# Patient Record
Sex: MALE | Race: WHITE | URBAN - NONMETROPOLITAN AREA
[De-identification: names, ages, dates, MRNs, and addresses within clinical notes are randomized per-mention and may not be internally consistent; named-entity substitution may affect disease eponyms.]

---

## 2017-01-12 ENCOUNTER — OFFICE VISIT (OUTPATIENT)
Dept: FAMILY MEDICINE CLINIC | Age: 32
End: 2017-01-12

## 2017-01-12 VITALS
HEIGHT: 71 IN | OXYGEN SATURATION: 98 % | BODY MASS INDEX: 31.02 KG/M2 | WEIGHT: 221.6 LBS | HEART RATE: 86 BPM | DIASTOLIC BLOOD PRESSURE: 68 MMHG | SYSTOLIC BLOOD PRESSURE: 128 MMHG

## 2017-01-12 DIAGNOSIS — M25.561 ACUTE PAIN OF RIGHT KNEE: Primary | ICD-10-CM

## 2017-01-12 PROCEDURE — 99213 OFFICE O/P EST LOW 20 MIN: CPT | Performed by: FAMILY MEDICINE

## 2017-01-12 RX ORDER — NAPROXEN 500 MG/1
500 TABLET ORAL 2 TIMES DAILY WITH MEALS
COMMUNITY
End: 2017-02-06

## 2017-01-12 RX ORDER — MELOXICAM 15 MG/1
15 TABLET ORAL DAILY
Qty: 30 TABLET | Refills: 0 | Status: SHIPPED | OUTPATIENT
Start: 2017-01-12 | End: 2017-02-06

## 2017-01-12 ASSESSMENT — ENCOUNTER SYMPTOMS: SHORTNESS OF BREATH: 0

## 2017-01-17 ENCOUNTER — TELEPHONE (OUTPATIENT)
Dept: FAMILY MEDICINE CLINIC | Age: 32
End: 2017-01-17

## 2017-01-19 ENCOUNTER — OFFICE VISIT (OUTPATIENT)
Dept: ORTHOPEDIC SURGERY | Age: 32
End: 2017-01-19

## 2017-01-19 VITALS
BODY MASS INDEX: 31.02 KG/M2 | HEART RATE: 82 BPM | SYSTOLIC BLOOD PRESSURE: 118 MMHG | HEIGHT: 71 IN | DIASTOLIC BLOOD PRESSURE: 72 MMHG | WEIGHT: 221.56 LBS

## 2017-01-19 DIAGNOSIS — S83.511A RUPTURE OF ANTERIOR CRUCIATE LIGAMENT OF RIGHT KNEE, INITIAL ENCOUNTER: Primary | ICD-10-CM

## 2017-01-19 DIAGNOSIS — S83.211A BUCKET-HANDLE TEAR OF MEDIAL MENISCUS OF RIGHT KNEE AS CURRENT INJURY, INITIAL ENCOUNTER: ICD-10-CM

## 2017-01-19 DIAGNOSIS — S83.411A SPRAIN OF MEDIAL COLLATERAL LIGAMENT OF RIGHT KNEE, INITIAL ENCOUNTER: ICD-10-CM

## 2017-01-19 PROCEDURE — 99243 OFF/OP CNSLTJ NEW/EST LOW 30: CPT | Performed by: ORTHOPAEDIC SURGERY

## 2017-01-21 ENCOUNTER — HOSPITAL ENCOUNTER (OUTPATIENT)
Dept: MRI IMAGING | Age: 32
Discharge: HOME | End: 2017-01-21
Attending: ORTHOPAEDIC SURGERY | Admitting: ORTHOPAEDIC SURGERY

## 2017-01-21 DIAGNOSIS — M25.561 PAIN IN RIGHT KNEE: ICD-10-CM

## 2017-01-21 DIAGNOSIS — S83.511A RUPTURE OF ANTERIOR CRUCIATE LIGAMENT OF RIGHT KNEE, INITIAL ENCOUNTER: ICD-10-CM

## 2017-01-24 ENCOUNTER — OFFICE VISIT (OUTPATIENT)
Dept: ORTHOPEDIC SURGERY | Age: 32
End: 2017-01-24

## 2017-01-24 VITALS
HEART RATE: 96 BPM | WEIGHT: 221.56 LBS | SYSTOLIC BLOOD PRESSURE: 101 MMHG | HEIGHT: 71 IN | DIASTOLIC BLOOD PRESSURE: 69 MMHG | BODY MASS INDEX: 31.02 KG/M2

## 2017-01-24 DIAGNOSIS — S83.511A RUPTURE OF ANTERIOR CRUCIATE LIGAMENT OF RIGHT KNEE, INITIAL ENCOUNTER: Primary | ICD-10-CM

## 2017-01-24 DIAGNOSIS — S83.241A OTHER TEAR OF MEDIAL MENISCUS OF RIGHT KNEE AS CURRENT INJURY, INITIAL ENCOUNTER: ICD-10-CM

## 2017-01-24 PROCEDURE — L1832 KO ADJ JNT POS R SUP PRE CST: HCPCS | Performed by: ORTHOPAEDIC SURGERY

## 2017-01-24 PROCEDURE — 99214 OFFICE O/P EST MOD 30 MIN: CPT | Performed by: ORTHOPAEDIC SURGERY

## 2017-01-25 ENCOUNTER — HOSPITAL ENCOUNTER (OUTPATIENT)
Dept: PHYSICAL THERAPY | Age: 32
Discharge: HOME | End: 2017-01-31
Admitting: ORTHOPAEDIC SURGERY

## 2017-01-31 ENCOUNTER — OFFICE VISIT (OUTPATIENT)
Dept: ORTHOPEDIC SURGERY | Age: 32
End: 2017-01-31

## 2017-01-31 VITALS
DIASTOLIC BLOOD PRESSURE: 76 MMHG | HEART RATE: 79 BPM | SYSTOLIC BLOOD PRESSURE: 119 MMHG | HEIGHT: 71 IN | BODY MASS INDEX: 31.02 KG/M2 | WEIGHT: 221.56 LBS

## 2017-01-31 DIAGNOSIS — S83.511A RUPTURE OF ANTERIOR CRUCIATE LIGAMENT OF RIGHT KNEE, INITIAL ENCOUNTER: Primary | ICD-10-CM

## 2017-01-31 DIAGNOSIS — S83.241A TEAR OF MEDIAL MENISCUS OF RIGHT KNEE, CURRENT, UNSPECIFIED TEAR TYPE, INITIAL ENCOUNTER: ICD-10-CM

## 2017-01-31 PROCEDURE — 99213 OFFICE O/P EST LOW 20 MIN: CPT | Performed by: ORTHOPAEDIC SURGERY

## 2017-02-01 ENCOUNTER — TELEPHONE (OUTPATIENT)
Dept: ORTHOPEDIC SURGERY | Age: 32
End: 2017-02-01

## 2017-02-02 ENCOUNTER — PAT TELEPHONE (OUTPATIENT)
Dept: PREADMISSION TESTING | Age: 32
End: 2017-02-02

## 2017-02-06 ENCOUNTER — OFFICE VISIT (OUTPATIENT)
Dept: FAMILY MEDICINE CLINIC | Age: 32
End: 2017-02-06

## 2017-02-06 VITALS
HEART RATE: 80 BPM | SYSTOLIC BLOOD PRESSURE: 126 MMHG | WEIGHT: 222.2 LBS | BODY MASS INDEX: 31.11 KG/M2 | HEIGHT: 71 IN | OXYGEN SATURATION: 98 % | TEMPERATURE: 97.8 F | DIASTOLIC BLOOD PRESSURE: 78 MMHG

## 2017-02-06 DIAGNOSIS — Z01.818 PREOP EXAMINATION: Primary | ICD-10-CM

## 2017-02-06 DIAGNOSIS — S83.211D BUCKET-HANDLE TEAR OF MEDIAL MENISCUS OF RIGHT KNEE AS CURRENT INJURY, SUBSEQUENT ENCOUNTER: ICD-10-CM

## 2017-02-06 DIAGNOSIS — S83.511D RUPTURE OF ANTERIOR CRUCIATE LIGAMENT OF RIGHT KNEE, SUBSEQUENT ENCOUNTER: ICD-10-CM

## 2017-02-06 PROCEDURE — 99213 OFFICE O/P EST LOW 20 MIN: CPT | Performed by: FAMILY MEDICINE

## 2017-02-06 ASSESSMENT — ENCOUNTER SYMPTOMS: SHORTNESS OF BREATH: 0

## 2017-02-07 ENCOUNTER — TELEPHONE (OUTPATIENT)
Dept: ORTHOPEDIC SURGERY | Age: 32
End: 2017-02-07

## 2017-02-08 ENCOUNTER — HOSPITAL ENCOUNTER (OUTPATIENT)
Dept: OTHER | Age: 32
Discharge: HOME | End: 2017-02-08
Attending: ORTHOPAEDIC SURGERY | Admitting: ORTHOPAEDIC SURGERY

## 2017-02-08 ENCOUNTER — HOSPITAL ENCOUNTER (OUTPATIENT)
Dept: SURGERY | Age: 32
Discharge: HOME | End: 2017-02-08
Admitting: ORTHOPAEDIC SURGERY

## 2017-02-08 VITALS
OXYGEN SATURATION: 100 % | SYSTOLIC BLOOD PRESSURE: 114 MMHG | HEART RATE: 76 BPM | HEIGHT: 71 IN | WEIGHT: 220 LBS | TEMPERATURE: 97.2 F | RESPIRATION RATE: 16 BRPM | DIASTOLIC BLOOD PRESSURE: 68 MMHG | BODY MASS INDEX: 30.8 KG/M2

## 2017-02-08 DIAGNOSIS — Z98.890 STATUS POST MEDIAL MENISCUS REPAIR: Primary | ICD-10-CM

## 2017-02-08 RX ORDER — MEPERIDINE HYDROCHLORIDE 25 MG/ML
12.5 INJECTION INTRAMUSCULAR; INTRAVENOUS; SUBCUTANEOUS EVERY 5 MIN PRN
Status: DISCONTINUED | OUTPATIENT
Start: 2017-02-08 | End: 2017-02-09 | Stop reason: HOSPADM

## 2017-02-08 RX ORDER — OXYCODONE HYDROCHLORIDE 5 MG/1
5 TABLET ORAL PRN
Status: ACTIVE | OUTPATIENT
Start: 2017-02-08 | End: 2017-02-08

## 2017-02-08 RX ORDER — BUPIVACAINE HYDROCHLORIDE 2.5 MG/ML
INJECTION, SOLUTION EPIDURAL; INFILTRATION; INTRACAUDAL
Status: DISPENSED
Start: 2017-02-08 | End: 2017-02-08

## 2017-02-08 RX ORDER — PROMETHAZINE HYDROCHLORIDE 25 MG/ML
6.25 INJECTION, SOLUTION INTRAMUSCULAR; INTRAVENOUS
Status: ACTIVE | OUTPATIENT
Start: 2017-02-08 | End: 2017-02-08

## 2017-02-08 RX ORDER — MIDAZOLAM HYDROCHLORIDE 1 MG/ML
INJECTION INTRAMUSCULAR; INTRAVENOUS
Status: DISPENSED
Start: 2017-02-08 | End: 2017-02-08

## 2017-02-08 RX ORDER — MORPHINE SULFATE 10 MG/ML
1 INJECTION, SOLUTION INTRAMUSCULAR; INTRAVENOUS EVERY 5 MIN PRN
Status: DISCONTINUED | OUTPATIENT
Start: 2017-02-08 | End: 2017-02-09 | Stop reason: HOSPADM

## 2017-02-08 RX ORDER — LIDOCAINE HYDROCHLORIDE 10 MG/ML
1 INJECTION, SOLUTION EPIDURAL; INFILTRATION; INTRACAUDAL; PERINEURAL
Status: COMPLETED | OUTPATIENT
Start: 2017-02-08 | End: 2017-02-08

## 2017-02-08 RX ORDER — HYDRALAZINE HYDROCHLORIDE 20 MG/ML
5 INJECTION INTRAMUSCULAR; INTRAVENOUS EVERY 10 MIN PRN
Status: DISCONTINUED | OUTPATIENT
Start: 2017-02-08 | End: 2017-02-09 | Stop reason: HOSPADM

## 2017-02-08 RX ORDER — OXYCODONE HYDROCHLORIDE AND ACETAMINOPHEN 5; 325 MG/1; MG/1
1 TABLET ORAL EVERY 6 HOURS PRN
Qty: 40 TABLET | Refills: 0 | Status: SHIPPED | OUTPATIENT
Start: 2017-02-08 | End: 2017-02-15

## 2017-02-08 RX ORDER — HYDROMORPHONE HCL 110MG/55ML
0.5 PATIENT CONTROLLED ANALGESIA SYRINGE INTRAVENOUS EVERY 5 MIN PRN
Status: DISCONTINUED | OUTPATIENT
Start: 2017-02-08 | End: 2017-02-09 | Stop reason: HOSPADM

## 2017-02-08 RX ORDER — METOCLOPRAMIDE HYDROCHLORIDE 5 MG/ML
10 INJECTION INTRAMUSCULAR; INTRAVENOUS
Status: ACTIVE | OUTPATIENT
Start: 2017-02-08 | End: 2017-02-08

## 2017-02-08 RX ORDER — LABETALOL HYDROCHLORIDE 5 MG/ML
5 INJECTION, SOLUTION INTRAVENOUS EVERY 10 MIN PRN
Status: DISCONTINUED | OUTPATIENT
Start: 2017-02-08 | End: 2017-02-09 | Stop reason: HOSPADM

## 2017-02-08 RX ORDER — HYDROMORPHONE HCL 110MG/55ML
0.25 PATIENT CONTROLLED ANALGESIA SYRINGE INTRAVENOUS EVERY 5 MIN PRN
Status: DISCONTINUED | OUTPATIENT
Start: 2017-02-08 | End: 2017-02-09 | Stop reason: HOSPADM

## 2017-02-08 RX ORDER — DEXAMETHASONE SODIUM PHOSPHATE 10 MG/ML
INJECTION, SOLUTION INTRAMUSCULAR; INTRAVENOUS
Status: DISPENSED
Start: 2017-02-08 | End: 2017-02-08

## 2017-02-08 RX ORDER — DIPHENHYDRAMINE HYDROCHLORIDE 50 MG/ML
12.5 INJECTION INTRAMUSCULAR; INTRAVENOUS
Status: ACTIVE | OUTPATIENT
Start: 2017-02-08 | End: 2017-02-08

## 2017-02-08 RX ORDER — BUPIVACAINE HYDROCHLORIDE 5 MG/ML
INJECTION, SOLUTION EPIDURAL; INTRACAUDAL
Status: DISPENSED
Start: 2017-02-08 | End: 2017-02-08

## 2017-02-08 RX ORDER — OXYCODONE HYDROCHLORIDE 5 MG/1
10 TABLET ORAL PRN
Status: ACTIVE | OUTPATIENT
Start: 2017-02-08 | End: 2017-02-08

## 2017-02-08 RX ORDER — ACETAMINOPHEN 10 MG/ML
1000 INJECTION, SOLUTION INTRAVENOUS ONCE
Status: COMPLETED | OUTPATIENT
Start: 2017-02-08 | End: 2017-02-08

## 2017-02-08 RX ORDER — CEFAZOLIN SODIUM 1 G/3ML
INJECTION, POWDER, FOR SOLUTION INTRAMUSCULAR; INTRAVENOUS
Status: DISPENSED
Start: 2017-02-08 | End: 2017-02-08

## 2017-02-08 RX ORDER — ONDANSETRON 2 MG/ML
4 INJECTION INTRAMUSCULAR; INTRAVENOUS ONCE
Status: DISCONTINUED | OUTPATIENT
Start: 2017-02-08 | End: 2017-02-09 | Stop reason: HOSPADM

## 2017-02-08 RX ORDER — SODIUM CHLORIDE 0.9 % (FLUSH) 0.9 %
10 SYRINGE (ML) INJECTION EVERY 12 HOURS SCHEDULED
Status: DISCONTINUED | OUTPATIENT
Start: 2017-02-08 | End: 2017-02-09 | Stop reason: HOSPADM

## 2017-02-08 RX ORDER — ONDANSETRON 2 MG/ML
INJECTION INTRAMUSCULAR; INTRAVENOUS
Status: COMPLETED
Start: 2017-02-08 | End: 2017-02-08

## 2017-02-08 RX ORDER — SODIUM CHLORIDE, SODIUM LACTATE, POTASSIUM CHLORIDE, CALCIUM CHLORIDE 600; 310; 30; 20 MG/100ML; MG/100ML; MG/100ML; MG/100ML
INJECTION, SOLUTION INTRAVENOUS CONTINUOUS
Status: DISCONTINUED | OUTPATIENT
Start: 2017-02-08 | End: 2017-02-09 | Stop reason: HOSPADM

## 2017-02-08 RX ORDER — SODIUM CHLORIDE 0.9 % (FLUSH) 0.9 %
10 SYRINGE (ML) INJECTION PRN
Status: DISCONTINUED | OUTPATIENT
Start: 2017-02-08 | End: 2017-02-09 | Stop reason: HOSPADM

## 2017-02-08 RX ADMIN — LIDOCAINE HYDROCHLORIDE 0.1 ML: 10 INJECTION, SOLUTION EPIDURAL; INFILTRATION; INTRACAUDAL; PERINEURAL at 06:54

## 2017-02-08 RX ADMIN — SODIUM CHLORIDE, SODIUM LACTATE, POTASSIUM CHLORIDE, CALCIUM CHLORIDE: 600; 310; 30; 20 INJECTION, SOLUTION INTRAVENOUS at 06:54

## 2017-02-08 RX ADMIN — ACETAMINOPHEN 1000 MG: 10 INJECTION, SOLUTION INTRAVENOUS at 06:55

## 2017-02-08 RX ADMIN — ONDANSETRON 4 MG: 2 INJECTION INTRAMUSCULAR; INTRAVENOUS at 10:32

## 2017-02-08 ASSESSMENT — PAIN SCALES - GENERAL
PAINLEVEL_OUTOF10: 0

## 2017-02-08 ASSESSMENT — PAIN - FUNCTIONAL ASSESSMENT: PAIN_FUNCTIONAL_ASSESSMENT: 0-10

## 2017-02-21 ENCOUNTER — OFFICE VISIT (OUTPATIENT)
Dept: ORTHOPEDIC SURGERY | Age: 32
End: 2017-02-21

## 2017-02-21 VITALS
WEIGHT: 220.02 LBS | HEIGHT: 71 IN | HEART RATE: 82 BPM | DIASTOLIC BLOOD PRESSURE: 83 MMHG | BODY MASS INDEX: 30.8 KG/M2 | SYSTOLIC BLOOD PRESSURE: 134 MMHG

## 2017-02-21 DIAGNOSIS — M25.561 RIGHT KNEE PAIN, UNSPECIFIED CHRONICITY: Primary | ICD-10-CM

## 2017-02-21 DIAGNOSIS — Z98.890 STATUS POST MEDIAL MENISCUS REPAIR: ICD-10-CM

## 2017-02-21 DIAGNOSIS — Z98.890 S/P ACL RECONSTRUCTION: ICD-10-CM

## 2017-02-21 PROCEDURE — 73560 X-RAY EXAM OF KNEE 1 OR 2: CPT | Performed by: ORTHOPAEDIC SURGERY

## 2017-02-21 PROCEDURE — 99024 POSTOP FOLLOW-UP VISIT: CPT | Performed by: ORTHOPAEDIC SURGERY

## 2017-03-21 ENCOUNTER — OFFICE VISIT (OUTPATIENT)
Dept: ORTHOPEDIC SURGERY | Age: 32
End: 2017-03-21

## 2017-03-21 VITALS
HEART RATE: 82 BPM | DIASTOLIC BLOOD PRESSURE: 74 MMHG | HEIGHT: 71 IN | BODY MASS INDEX: 30.8 KG/M2 | SYSTOLIC BLOOD PRESSURE: 116 MMHG | WEIGHT: 220.02 LBS

## 2017-03-21 DIAGNOSIS — S83.511D RUPTURE OF ANTERIOR CRUCIATE LIGAMENT OF RIGHT KNEE, SUBSEQUENT ENCOUNTER: Primary | ICD-10-CM

## 2017-03-21 DIAGNOSIS — S83.411D SPRAIN OF MEDIAL COLLATERAL LIGAMENT OF RIGHT KNEE, SUBSEQUENT ENCOUNTER: ICD-10-CM

## 2017-03-21 DIAGNOSIS — S83.211D BUCKET-HANDLE TEAR OF MEDIAL MENISCUS OF RIGHT KNEE AS CURRENT INJURY, SUBSEQUENT ENCOUNTER: ICD-10-CM

## 2017-03-21 PROCEDURE — 99024 POSTOP FOLLOW-UP VISIT: CPT | Performed by: ORTHOPAEDIC SURGERY

## 2017-03-27 ENCOUNTER — HOSPITAL ENCOUNTER (OUTPATIENT)
Dept: PHYSICAL THERAPY | Age: 32
Discharge: HOME | End: 2017-03-27
Admitting: ORTHOPAEDIC SURGERY

## 2017-03-29 ENCOUNTER — HOSPITAL ENCOUNTER (OUTPATIENT)
Dept: PHYSICAL THERAPY | Age: 32
Discharge: HOME | End: 2017-03-29
Admitting: ORTHOPAEDIC SURGERY

## 2017-04-03 ENCOUNTER — HOSPITAL ENCOUNTER (OUTPATIENT)
Dept: PHYSICAL THERAPY | Age: 32
Discharge: HOME | End: 2017-04-03
Admitting: ORTHOPAEDIC SURGERY

## 2017-04-14 ENCOUNTER — TELEPHONE (OUTPATIENT)
Dept: ORTHOPEDIC SURGERY | Age: 32
End: 2017-04-14

## 2017-04-17 ENCOUNTER — HOSPITAL ENCOUNTER (OUTPATIENT)
Dept: PHYSICAL THERAPY | Age: 32
Discharge: HOME | End: 2017-04-17
Admitting: ORTHOPAEDIC SURGERY

## 2017-04-20 ENCOUNTER — TELEPHONE (OUTPATIENT)
Dept: ORTHOPEDIC SURGERY | Age: 32
End: 2017-04-20

## 2017-04-25 ENCOUNTER — OFFICE VISIT (OUTPATIENT)
Dept: ORTHOPEDIC SURGERY | Age: 32
End: 2017-04-25

## 2017-04-25 VITALS — HEIGHT: 71 IN | BODY MASS INDEX: 30.8 KG/M2 | WEIGHT: 220.02 LBS

## 2017-04-25 DIAGNOSIS — Z98.890 S/P ACL REPAIR: Primary | ICD-10-CM

## 2017-04-25 DIAGNOSIS — Z98.890 STATUS POST MEDIAL MENISCUS REPAIR: ICD-10-CM

## 2017-04-25 DIAGNOSIS — S83.411D SPRAIN OF MEDIAL COLLATERAL LIGAMENT OF RIGHT KNEE, SUBSEQUENT ENCOUNTER: ICD-10-CM

## 2017-04-25 PROCEDURE — 73560 X-RAY EXAM OF KNEE 1 OR 2: CPT | Performed by: ORTHOPAEDIC SURGERY

## 2017-04-25 PROCEDURE — 99024 POSTOP FOLLOW-UP VISIT: CPT | Performed by: ORTHOPAEDIC SURGERY

## 2017-05-01 ENCOUNTER — HOSPITAL ENCOUNTER (OUTPATIENT)
Dept: PHYSICAL THERAPY | Age: 32
Discharge: HOME | End: 2017-05-01
Admitting: ORTHOPAEDIC SURGERY

## 2017-05-03 ENCOUNTER — TELEPHONE (OUTPATIENT)
Dept: ORTHOPEDIC SURGERY | Age: 32
End: 2017-05-03

## 2017-05-10 ENCOUNTER — OFFICE VISIT (OUTPATIENT)
Dept: FAMILY MEDICINE CLINIC | Age: 32
End: 2017-05-10

## 2017-05-10 VITALS
DIASTOLIC BLOOD PRESSURE: 80 MMHG | WEIGHT: 221.8 LBS | SYSTOLIC BLOOD PRESSURE: 120 MMHG | OXYGEN SATURATION: 98 % | HEIGHT: 71 IN | HEART RATE: 104 BPM | BODY MASS INDEX: 31.05 KG/M2

## 2017-05-10 DIAGNOSIS — R07.1 CHEST PAIN ON BREATHING: Primary | ICD-10-CM

## 2017-05-10 DIAGNOSIS — R07.89 OTHER CHEST PAIN: ICD-10-CM

## 2017-05-10 DIAGNOSIS — R42 LIGHTHEADEDNESS: ICD-10-CM

## 2017-05-10 PROCEDURE — 93000 ELECTROCARDIOGRAM COMPLETE: CPT | Performed by: FAMILY MEDICINE

## 2017-05-10 PROCEDURE — 99213 OFFICE O/P EST LOW 20 MIN: CPT | Performed by: FAMILY MEDICINE

## 2017-05-10 RX ORDER — NORTRIPTYLINE HYDROCHLORIDE 25 MG/1
25 CAPSULE ORAL NIGHTLY
COMMUNITY
End: 2017-10-24 | Stop reason: ALTCHOICE

## 2017-05-10 ASSESSMENT — ENCOUNTER SYMPTOMS
SHORTNESS OF BREATH: 0
VOMITING: 0
NAUSEA: 0

## 2017-08-22 ENCOUNTER — OFFICE VISIT (OUTPATIENT)
Dept: ORTHOPEDIC SURGERY | Age: 32
End: 2017-08-22

## 2017-08-22 VITALS — HEIGHT: 71 IN | BODY MASS INDEX: 31.05 KG/M2 | WEIGHT: 221.78 LBS

## 2017-08-22 DIAGNOSIS — Z98.890 S/P ACL REPAIR: ICD-10-CM

## 2017-08-22 DIAGNOSIS — Z98.890 STATUS POST MEDIAL MENISCUS REPAIR: Primary | ICD-10-CM

## 2017-08-22 PROCEDURE — 99213 OFFICE O/P EST LOW 20 MIN: CPT | Performed by: ORTHOPAEDIC SURGERY

## 2017-10-24 ENCOUNTER — OFFICE VISIT (OUTPATIENT)
Dept: FAMILY MEDICINE CLINIC | Age: 32
End: 2017-10-24

## 2017-10-24 VITALS
SYSTOLIC BLOOD PRESSURE: 128 MMHG | HEIGHT: 71 IN | BODY MASS INDEX: 31.61 KG/M2 | WEIGHT: 225.8 LBS | HEART RATE: 92 BPM | OXYGEN SATURATION: 98 % | DIASTOLIC BLOOD PRESSURE: 80 MMHG

## 2017-10-24 DIAGNOSIS — L60.0 INGROWN NAIL OF GREAT TOE OF RIGHT FOOT: Primary | ICD-10-CM

## 2017-10-24 PROCEDURE — 99213 OFFICE O/P EST LOW 20 MIN: CPT | Performed by: FAMILY MEDICINE

## 2017-10-24 PROCEDURE — 1036F TOBACCO NON-USER: CPT | Performed by: FAMILY MEDICINE

## 2017-10-24 PROCEDURE — G8484 FLU IMMUNIZE NO ADMIN: HCPCS | Performed by: FAMILY MEDICINE

## 2017-10-24 PROCEDURE — G8417 CALC BMI ABV UP PARAM F/U: HCPCS | Performed by: FAMILY MEDICINE

## 2017-10-24 PROCEDURE — G8427 DOCREV CUR MEDS BY ELIG CLIN: HCPCS | Performed by: FAMILY MEDICINE

## 2017-10-24 RX ORDER — CEPHALEXIN 500 MG/1
500 CAPSULE ORAL 3 TIMES DAILY
Qty: 30 CAPSULE | Refills: 0 | Status: SHIPPED | OUTPATIENT
Start: 2017-10-24 | End: 2018-05-25 | Stop reason: ALTCHOICE

## 2017-10-24 RX ORDER — DESIPRAMINE HYDROCHLORIDE 25 MG/1
12.5 TABLET ORAL DAILY
COMMUNITY

## 2017-10-24 ASSESSMENT — ENCOUNTER SYMPTOMS: COLOR CHANGE: 1

## 2017-10-24 NOTE — PROGRESS NOTES
Subjective:      Patient ID: Christa Lopez is a 28 y.o. male. HPI  Ingrown nail over past several weeks. Soreness noted. Has had drainage and swelling. Not too bad right now. More swollen when on it more. No fevers noted. Review of Systems   Constitutional: Negative for fever. Skin: Positive for color change. Ingrown nail and pain       Objective:   Physical Exam   Constitutional: He is oriented to person, place, and time. He appears well-developed and well-nourished. HENT:   Head: Normocephalic and atraumatic. Eyes: Conjunctivae and EOM are normal.   Pulmonary/Chest: Effort normal.   Musculoskeletal:        Feet:    Neurological: He is alert and oriented to person, place, and time. Skin: Skin is warm and dry. Psychiatric: He has a normal mood and affect. /80   Pulse 92   Ht 5' 11\" (1.803 m)   Wt 225 lb 12.8 oz (102.4 kg)   SpO2 98%   BMI 31.49 kg/m²   Assessment:      1. Ingrown nail of great toe of right foot  Try add abx to help infxn. Epsom salts soaks. If not better, consider podiatry for possible partial nail removal or trimming to alleviate symptoms  - cephALEXin (KEFLEX) 500 MG capsule; Take 1 capsule by mouth 3 times daily  Dispense: 30 capsule;  Refill: 0         Plan:      Mentioned at end of visit that headaches improved w/ meds ordered by neuro

## 2017-11-03 ENCOUNTER — TELEPHONE (OUTPATIENT)
Dept: FAMILY MEDICINE CLINIC | Age: 32
End: 2017-11-03

## 2017-11-03 DIAGNOSIS — M79.671 RIGHT FOOT PAIN: Primary | ICD-10-CM

## 2018-01-04 ENCOUNTER — TELEPHONE (OUTPATIENT)
Dept: FAMILY MEDICINE CLINIC | Age: 33
End: 2018-01-04

## 2018-01-10 ENCOUNTER — HOSPITAL ENCOUNTER (OUTPATIENT)
Dept: PHYSICAL THERAPY | Age: 33
Discharge: HOME | End: 2018-01-31
Admitting: ORTHOPAEDIC SURGERY

## 2018-01-10 NOTE — FLOWSHEET NOTE
[facility]        Lower Extremity Daily Performance Training Note  Date:  1/10/2018    Patient Name:  Sahra Fuentes    :  1985  MRN: [id number]  Restrictions/Precautions:   Medical/Treatment Diagnosis Information:   ·  R knee ACL/meniscus repair  ·    Insurance/Certification information:       Physician Information:   Martinez      Pain level: 1-2/10 With jumping    Visit Number: 1    Subjective: Pt notes he has been doing some sporting activities. Notes he goes to the gym back home. He continues to have difficulty doing any \"skipping or jumping\". Objective:  Observation: Decent form with jumping. Poor conditioning. Exercises:  Exercise/Equipment Resistance/Repetitions Other comments   Eliptical  7' lkvl 4    Calf stretch/ calf raise 30\" x 3/ x 30    Hamstring stretch  30\" x 2 ea    Ladder drill  Bunny hop/2 toot in 2 foot out/2 foot in 1 foot out/Hop scotch X 3 ea   Clam shells vs wall/glut med vs wall X30/0# x 30 ea    Wall sits  1' x 5    Monster walks  X 5    LSD 6\"  X 30                                                                  Other Therapeutic Activities:     Home Exercise Program:  At home and at local rec center. Patient Education:    verbal      Assessment:  [x] Patient tolerated treatment well [x] Patient limited by fatigue  [] Patient limited by pain  [] Patient limited by other medical complications  [] Other:     Prognosis: [x] Good [] Fair  [] Poor    Patient Requires Follow-up: [x] Yes  [] No    Plan:   [x] Continue per plan of care [] Alter current plan (see comments)  [] Plan of care initiated [] Hold pending MD visit [] Discharge  Plan for Next Session: Continue rehab at home and at gym. Follow up in [place] to advance program.      MD Follow-up: JUSTIN    Electronically signed by:  Román Villanueva ATC     Tx was performed by STEVO as a part of the [facility] program following PT's recommendations/guidance. Cosigned by:

## 2018-02-01 ENCOUNTER — HOSPITAL ENCOUNTER (OUTPATIENT)
Dept: PHYSICAL THERAPY | Age: 33
Discharge: HOME | End: 2018-02-28
Attending: ORTHOPAEDIC SURGERY | Admitting: ORTHOPAEDIC SURGERY

## 2018-02-06 ENCOUNTER — OFFICE VISIT (OUTPATIENT)
Dept: ORTHOPEDIC SURGERY | Age: 33
End: 2018-02-06

## 2018-02-06 VITALS
WEIGHT: 215 LBS | BODY MASS INDEX: 30.1 KG/M2 | HEIGHT: 71 IN | SYSTOLIC BLOOD PRESSURE: 123 MMHG | DIASTOLIC BLOOD PRESSURE: 79 MMHG | HEART RATE: 84 BPM

## 2018-02-06 DIAGNOSIS — M25.561 RIGHT KNEE PAIN, UNSPECIFIED CHRONICITY: Primary | ICD-10-CM

## 2018-02-06 DIAGNOSIS — Z98.890 S/P ACL REPAIR: ICD-10-CM

## 2018-02-06 DIAGNOSIS — Z98.890 STATUS POST MEDIAL MENISCUS REPAIR: ICD-10-CM

## 2018-02-06 PROCEDURE — G8484 FLU IMMUNIZE NO ADMIN: HCPCS | Performed by: ORTHOPAEDIC SURGERY

## 2018-02-06 PROCEDURE — 1036F TOBACCO NON-USER: CPT | Performed by: ORTHOPAEDIC SURGERY

## 2018-02-06 PROCEDURE — G8417 CALC BMI ABV UP PARAM F/U: HCPCS | Performed by: ORTHOPAEDIC SURGERY

## 2018-02-06 PROCEDURE — G8427 DOCREV CUR MEDS BY ELIG CLIN: HCPCS | Performed by: ORTHOPAEDIC SURGERY

## 2018-02-06 PROCEDURE — 99213 OFFICE O/P EST LOW 20 MIN: CPT | Performed by: ORTHOPAEDIC SURGERY

## 2018-02-06 NOTE — PROGRESS NOTES
Chief Complaint:  Follow-up (one yr check up ACL repair)      SUBJECTIVE:  Juna Aase is a 28 y.o. male who returns today 1 year s/p right knee arthroscopy with ACL reconstruction with allograft and medial meniscus repair. Overall he is 70% improved from before surgery, is still not fully completed physical therapy, is working through the [employer] program at this time. Has 0 out of 10 pain, continues to notice weakness, denies episodes of instability. Has returned to work as a  without difficulty. He has returned to basketball on occasion and wears his brace. DOS: February 8, 2017    Pain Assessment:  Pain Assessment  Location of Pain: Knee  Location Modifiers: Right  Severity of Pain: 0      OBJECTIVE:  Vital Signs:  Vitals:    02/06/18 1033   BP: 123/79   Pulse: 84   Weight: 215 lb (97.5 kg)   Height: 5' 11\" (1.803 m)       Appearance: alert, well appearing, and in no distress, oriented to person, place, and time and normal appearing weight. Physical exam:   Distal neurovascular exam is intact, incisions are well-healed, range of motion 0-130°. No tenderness on the medial or lateral joint lines. Lachman exam is stable at 30° with firm endpoint noted. Negative Ludwin's. Atrophy is noted throughout quadriceps and hamstring. Able to do a straight leg raise. Diagnostic Test Findings:   Xrays obtained and reviewed in office. 2 views of the right knee xrays show normal post-op changes following an ACL reconstruction. Assessment :  One year status post ACL reconstruction and medial meniscus repair    Impression:  Encounter Diagnoses   Name Primary?  Right knee pain, unspecified chronicity Yes    S/P ACL repair     Status post medial meniscus repair        Office Procedures:  Orders Placed This Encounter   Procedures    XR KNEE RIGHT (1-2 VIEWS)     No orders of the defined types were placed in this encounter.         Treatment Plan:  Continue to progress with physical therapy

## 2018-05-25 ENCOUNTER — HOSPITAL ENCOUNTER (OUTPATIENT)
Dept: OTHER | Age: 33
Discharge: HOME | End: 2018-05-25
Attending: NURSE PRACTITIONER | Admitting: NURSE PRACTITIONER

## 2018-05-25 ENCOUNTER — OFFICE VISIT (OUTPATIENT)
Dept: FAMILY MEDICINE CLINIC | Age: 33
End: 2018-05-25

## 2018-05-25 VITALS
HEART RATE: 87 BPM | BODY MASS INDEX: 28.84 KG/M2 | SYSTOLIC BLOOD PRESSURE: 102 MMHG | DIASTOLIC BLOOD PRESSURE: 62 MMHG | HEIGHT: 71 IN | WEIGHT: 206 LBS | OXYGEN SATURATION: 98 %

## 2018-05-25 DIAGNOSIS — M25.561 ACUTE PAIN OF RIGHT KNEE: Primary | ICD-10-CM

## 2018-05-25 DIAGNOSIS — M25.561 ACUTE PAIN OF RIGHT KNEE: ICD-10-CM

## 2018-05-25 PROCEDURE — 99214 OFFICE O/P EST MOD 30 MIN: CPT | Performed by: NURSE PRACTITIONER

## 2018-05-25 RX ORDER — MELOXICAM 15 MG/1
15 TABLET ORAL DAILY
Qty: 30 TABLET | Refills: 0 | Status: SHIPPED | OUTPATIENT
Start: 2018-05-25 | End: 2020-01-13 | Stop reason: ALTCHOICE

## 2018-05-25 RX ORDER — GLUCOSAMINE/CHONDROITIN/C/MANG 500-400 MG
1 CAPSULE ORAL 2 TIMES DAILY
COMMUNITY
End: 2020-01-13 | Stop reason: ALTCHOICE

## 2018-05-25 ASSESSMENT — ENCOUNTER SYMPTOMS
DIARRHEA: 0
CHEST TIGHTNESS: 0
ABDOMINAL PAIN: 0
SHORTNESS OF BREATH: 0
CONSTIPATION: 0

## 2018-06-07 ENCOUNTER — OFFICE VISIT (OUTPATIENT)
Dept: FAMILY MEDICINE CLINIC | Age: 33
End: 2018-06-07

## 2018-06-07 VITALS
BODY MASS INDEX: 29.09 KG/M2 | DIASTOLIC BLOOD PRESSURE: 86 MMHG | HEART RATE: 98 BPM | SYSTOLIC BLOOD PRESSURE: 138 MMHG | WEIGHT: 207.8 LBS | OXYGEN SATURATION: 98 % | HEIGHT: 71 IN

## 2018-06-07 DIAGNOSIS — M25.561 ACUTE PAIN OF RIGHT KNEE: Primary | ICD-10-CM

## 2018-06-07 PROCEDURE — G8427 DOCREV CUR MEDS BY ELIG CLIN: HCPCS | Performed by: FAMILY MEDICINE

## 2018-06-07 PROCEDURE — G8417 CALC BMI ABV UP PARAM F/U: HCPCS | Performed by: FAMILY MEDICINE

## 2018-06-07 PROCEDURE — 99213 OFFICE O/P EST LOW 20 MIN: CPT | Performed by: FAMILY MEDICINE

## 2018-06-07 PROCEDURE — 1036F TOBACCO NON-USER: CPT | Performed by: FAMILY MEDICINE

## 2018-06-07 ASSESSMENT — ENCOUNTER SYMPTOMS
DIARRHEA: 0
CONSTIPATION: 0
CHEST TIGHTNESS: 0
BLOOD IN STOOL: 0
COLOR CHANGE: 0
ABDOMINAL PAIN: 0
SHORTNESS OF BREATH: 0

## 2018-06-19 ENCOUNTER — OFFICE VISIT (OUTPATIENT)
Dept: ORTHOPEDIC SURGERY | Age: 33
End: 2018-06-19

## 2018-06-19 VITALS
BODY MASS INDEX: 28.56 KG/M2 | DIASTOLIC BLOOD PRESSURE: 60 MMHG | HEIGHT: 71 IN | SYSTOLIC BLOOD PRESSURE: 119 MMHG | HEART RATE: 78 BPM | WEIGHT: 204 LBS

## 2018-06-19 DIAGNOSIS — Z98.890 H/O MEDIAL MENISCUS REPAIR OF RIGHT KNEE: ICD-10-CM

## 2018-06-19 DIAGNOSIS — Z98.890 S/P ACL REPAIR: ICD-10-CM

## 2018-06-19 DIAGNOSIS — Z98.890 STATUS POST MEDIAL MENISCUS REPAIR: ICD-10-CM

## 2018-06-19 DIAGNOSIS — M25.561 RIGHT KNEE PAIN, UNSPECIFIED CHRONICITY: Primary | ICD-10-CM

## 2018-06-19 PROCEDURE — G8427 DOCREV CUR MEDS BY ELIG CLIN: HCPCS | Performed by: ORTHOPAEDIC SURGERY

## 2018-06-19 PROCEDURE — 1036F TOBACCO NON-USER: CPT | Performed by: ORTHOPAEDIC SURGERY

## 2018-06-19 PROCEDURE — 99213 OFFICE O/P EST LOW 20 MIN: CPT | Performed by: ORTHOPAEDIC SURGERY

## 2018-06-19 PROCEDURE — G8417 CALC BMI ABV UP PARAM F/U: HCPCS | Performed by: ORTHOPAEDIC SURGERY

## 2018-07-01 ENCOUNTER — HOSPITAL ENCOUNTER (OUTPATIENT)
Dept: PHYSICAL THERAPY | Age: 33
Discharge: HOME | End: 2018-07-01
Attending: ORTHOPAEDIC SURGERY | Admitting: ORTHOPAEDIC SURGERY

## 2018-07-03 ENCOUNTER — HOSPITAL ENCOUNTER (OUTPATIENT)
Dept: PHYSICAL THERAPY | Age: 33
Discharge: HOME | End: 2018-07-04
Admitting: ORTHOPAEDIC SURGERY

## 2018-07-17 ENCOUNTER — HOSPITAL ENCOUNTER (OUTPATIENT)
Dept: PHYSICAL THERAPY | Age: 33
Setting detail: THERAPIES SERIES
Discharge: HOME | End: 2018-07-17
Payer: COMMERCIAL

## 2018-07-17 PROCEDURE — 9990000027 HC GAP GROUP: Performed by: PHYSICAL THERAPIST

## 2018-07-31 ENCOUNTER — HOSPITAL ENCOUNTER (OUTPATIENT)
Dept: PHYSICAL THERAPY | Age: 33
Setting detail: THERAPIES SERIES
Discharge: HOME | End: 2018-07-31
Payer: COMMERCIAL

## 2018-07-31 PROCEDURE — 9990000027 HC GAP GROUP: Performed by: SPECIALIST/TECHNOLOGIST

## 2018-08-14 ENCOUNTER — HOSPITAL ENCOUNTER (OUTPATIENT)
Dept: PHYSICAL THERAPY | Age: 33
Setting detail: THERAPIES SERIES
Discharge: HOME | End: 2018-08-14
Payer: COMMERCIAL

## 2018-08-14 PROCEDURE — 9990000027 HC GAP GROUP: Performed by: SPECIALIST/TECHNOLOGIST

## 2018-08-14 NOTE — FLOWSHEET NOTE
[facility]        Lower Extremity Daily Performance Training Note  Date:  2018    Patient Name:  Nabila Vaughn    :  1985  MRN: [id number]  Restrictions/Precautions:   Medical/Treatment Diagnosis Information:   ·  R knee ACL/meniscus repair     Insurance/Certification information:       Physician Information:   Juan      Pain level: 0-10    Visit Number:     Subjective: Pt notes that his leg has been a little more tired. Notes he had some difficulty with single leg hop on the ladder last workout. Note he did it at the end of the session. Objective:  Observation:      Exercises:  Exercise/Equipment Resistance/Repetitions Other comments   Eliptical  8' lvl 4    Calf stretch/ calf raise  Quad stretch 30\" x 3/ x 30  30\" x 3    Hamstring stretch  30\" x 2 ea    Ladder drill  2 foot in 1 out/2 foot in 2 foot out/   Single leg hop X 3 ea               Knee ext 90/30  hmstring curls20# x 30 c 3\" hold            DIDIER ABD           Ext           Flx           TKE R/L 45# x 30 ea  R/L 45# x 30 ea  R/L 45# x 30 ea  75# x 30 c 3\" hold    SLR + 30\" hold c 30 cw/ccw X 3 ea          T Drill 30\" x 4 ways     Jab steps FWD/BKWD/LAT x 20 ea                Other Therapeutic Activities:     Home Exercise Program:  At home and at local rec center. Patient Education:    verbal      Assessment:  [x] Patient tolerated treatment well [x] Patient limited by fatigue  [] Patient limited by pain  [] Patient limited by other medical complications  [] Other:     Prognosis: [x] Good [] Fair  [] Poor    Patient Requires Follow-up: [x] Yes  [] No    Plan:   [x] Continue per plan of care [] Alter current plan (see comments)  [] Plan of care initiated [] Hold pending MD visit [] Discharge  Plan for Next Session: Continue rehab at home and at gym.   Follow up in [place] to advance program.      MD Follow-up: TBD    Electronically signed by:  Jaime Madsen, STEVO

## 2018-08-28 ENCOUNTER — HOSPITAL ENCOUNTER (OUTPATIENT)
Dept: PHYSICAL THERAPY | Age: 33
Setting detail: THERAPIES SERIES
Discharge: HOME | End: 2018-08-28
Payer: COMMERCIAL

## 2018-08-28 PROCEDURE — 9990000027 HC GAP GROUP: Performed by: SPECIALIST/TECHNOLOGIST

## 2018-08-28 NOTE — FLOWSHEET NOTE
[facility]        Lower Extremity Daily Performance Training Note  Date:  2018    Patient Name:  Isaias Carlos    :  1985  MRN: [id number]  Restrictions/Precautions:   Medical/Treatment Diagnosis Information:   ·  R knee ACL/meniscus repair     Insurance/Certification information:       Physician Information:   Juan      Pain level: 0-1/10    Visit Number:     Subjective:Pt notes he has been very consistent with his exercises. He is going to come in once a month for the next couple months to stay on track. Objective:  Observation:Much improved mechanics with jumping since his initial visit. Looked sound on the treadmill until he started to fatigue. No pain with walk jog. Exercises:  Exercise/Equipment Resistance/Repetitions Other comments   Eliptical  8' lvl 4    Calf stretch/ calf raise  Quad stretch 30\" x 3/ x 30  30\" x 3    Hamstring stretch  30\" x 2 ea    Ladder drill  2 foot in 1 out/2 foot in 2 foot out/   Single leg hop X 3 ea               Knee ext 90/30  hmstring curls20# x 30 c 3\" hold                              Walk jog 1' walk 2' jog 4x    Swiss ball bridges  SL  X 30  X 15 ea    Triple threat X 30                               Other Therapeutic Activities:     Home Exercise Program:  At home and at local [facility] center. Patient Education:    verbal      Assessment:  [x] Patient tolerated treatment well [x] Patient limited by fatigue  [] Patient limited by pain  [] Patient limited by other medical complications  [] Other:     Prognosis: [x] Good [] Fair  [] Poor    Patient Requires Follow-up: [x] Yes  [] No    Plan:   [x] Continue per plan of care [] Alter current plan (see comments)  [] Plan of care initiated [] Hold pending MD visit [] Discharge  Plan for Next Session: Continue rehab at home and at gym.   Follow up in [place] to advance program.      MD Follow-up: JUSTIN    Electronically signed by:  Palak Guzman

## 2020-01-13 ENCOUNTER — OFFICE VISIT (OUTPATIENT)
Dept: FAMILY MEDICINE CLINIC | Age: 35
End: 2020-01-13
Payer: COMMERCIAL

## 2020-01-13 VITALS
SYSTOLIC BLOOD PRESSURE: 122 MMHG | HEART RATE: 95 BPM | OXYGEN SATURATION: 98 % | HEIGHT: 71 IN | DIASTOLIC BLOOD PRESSURE: 70 MMHG | WEIGHT: 208.4 LBS | BODY MASS INDEX: 29.18 KG/M2

## 2020-01-13 PROCEDURE — 1036F TOBACCO NON-USER: CPT | Performed by: FAMILY MEDICINE

## 2020-01-13 PROCEDURE — G8419 CALC BMI OUT NRM PARAM NOF/U: HCPCS | Performed by: FAMILY MEDICINE

## 2020-01-13 PROCEDURE — 99213 OFFICE O/P EST LOW 20 MIN: CPT | Performed by: FAMILY MEDICINE

## 2020-01-13 PROCEDURE — G8484 FLU IMMUNIZE NO ADMIN: HCPCS | Performed by: FAMILY MEDICINE

## 2020-01-13 PROCEDURE — G8427 DOCREV CUR MEDS BY ELIG CLIN: HCPCS | Performed by: FAMILY MEDICINE

## 2020-01-13 ASSESSMENT — PATIENT HEALTH QUESTIONNAIRE - PHQ9
1. LITTLE INTEREST OR PLEASURE IN DOING THINGS: 0
2. FEELING DOWN, DEPRESSED OR HOPELESS: 0
SUM OF ALL RESPONSES TO PHQ QUESTIONS 1-9: 0
SUM OF ALL RESPONSES TO PHQ9 QUESTIONS 1 & 2: 0
SUM OF ALL RESPONSES TO PHQ QUESTIONS 1-9: 0

## 2020-01-13 ASSESSMENT — ENCOUNTER SYMPTOMS
CHEST TIGHTNESS: 0
CONSTIPATION: 0
DIARRHEA: 0
BLOOD IN STOOL: 0
SHORTNESS OF BREATH: 0

## 2020-01-13 NOTE — PROGRESS NOTES
encounter  Prepped w/ betadine and etoh. Explored area w/ sterile needle and needle forceps. No obvious fb. Order for xray. If sxs persist, consider podiatry referral  - XR CALCANEUS LEFT (MIN 2 VIEWS); Future          Scribe attestation:I, Atul LOVETT, am scribing for and in the presence of Len Metz MD. Electronically signed by Atul LOVETT, on 1/13/2020 at 4:15 PM      Provider attestation: Rosalia Tamez MD, personally performed the services scribed by the user listed above in my presence, and it is both accurate and complete. I agree with the ROS and Past Histories independently gathered by the clinical support staff and the remaining scribed note accurately describes my personal service to the patient.     [facility]    1/13/2020  4:16 PM

## 2020-02-05 ENCOUNTER — TELEPHONE (OUTPATIENT)
Dept: FAMILY MEDICINE CLINIC | Age: 35
End: 2020-02-05

## 2020-02-06 ENCOUNTER — OFFICE VISIT (OUTPATIENT)
Dept: FAMILY MEDICINE CLINIC | Age: 35
End: 2020-02-06
Payer: COMMERCIAL

## 2020-02-06 VITALS
TEMPERATURE: 97.3 F | BODY MASS INDEX: 29.57 KG/M2 | HEIGHT: 71 IN | HEART RATE: 87 BPM | WEIGHT: 211.2 LBS | OXYGEN SATURATION: 99 % | SYSTOLIC BLOOD PRESSURE: 124 MMHG | DIASTOLIC BLOOD PRESSURE: 70 MMHG

## 2020-02-06 PROCEDURE — G8419 CALC BMI OUT NRM PARAM NOF/U: HCPCS | Performed by: FAMILY MEDICINE

## 2020-02-06 PROCEDURE — G8427 DOCREV CUR MEDS BY ELIG CLIN: HCPCS | Performed by: FAMILY MEDICINE

## 2020-02-06 PROCEDURE — 99213 OFFICE O/P EST LOW 20 MIN: CPT | Performed by: FAMILY MEDICINE

## 2020-02-06 PROCEDURE — G8484 FLU IMMUNIZE NO ADMIN: HCPCS | Performed by: FAMILY MEDICINE

## 2020-02-06 PROCEDURE — 1036F TOBACCO NON-USER: CPT | Performed by: FAMILY MEDICINE

## 2020-02-06 RX ORDER — CEFDINIR 300 MG/1
300 CAPSULE ORAL 2 TIMES DAILY
Qty: 20 CAPSULE | Refills: 0 | Status: SHIPPED | OUTPATIENT
Start: 2020-02-06 | End: 2020-02-16

## 2020-02-06 ASSESSMENT — ENCOUNTER SYMPTOMS
CONSTIPATION: 0
SHORTNESS OF BREATH: 0
DIARRHEA: 0
COUGH: 0
SORE THROAT: 1
NAUSEA: 0
BLOOD IN STOOL: 0
SINUS PRESSURE: 0
SINUS PAIN: 0
CHEST TIGHTNESS: 0

## 2020-02-06 NOTE — PROGRESS NOTES
Chief Complaint   Patient presents with    Fever    Pharyngitis       HPI:  Danis Garcia is a 29 y.o. (: 1985) here today   for   Fever    This is a new problem. The current episode started in the past 7 days. The problem occurs intermittently. The problem has been waxing and waning. His temperature was unmeasured prior to arrival. Associated symptoms include headaches, muscle aches and a sore throat. Pertinent negatives include no chest pain, congestion, coughing, diarrhea or nausea. He has tried acetaminophen (dayquil cold and flu) for the symptoms. The treatment provided mild relief. Pharyngitis   This is a new problem. The current episode started in the past 7 days. The problem occurs constantly. The problem has been unchanged. Associated symptoms include chills, fatigue, a fever, headaches and a sore throat. Pertinent negatives include no chest pain, congestion, coughing or nausea. Nothing aggravates the symptoms. Treatments tried: Dayquil cold and flu. The treatment provided mild relief. was getting better but started again last night. Woke up early this morning feverish and throat sore. Wife was treated for strep throat. Patient's medications, allergies, past medical, surgical, social and family histories were reviewed and updated as appropriate. ROS:  Review of Systems   Constitutional: Positive for chills, fatigue and fever. HENT: Positive for sore throat. Negative for congestion, sinus pressure and sinus pain. Respiratory: Negative for cough, chest tightness and shortness of breath. Cardiovascular: Negative for chest pain and palpitations. Gastrointestinal: Negative for blood in stool, constipation, diarrhea and nausea. Neurological: Positive for headaches. Negative for dizziness and light-headedness. Prior to Visit Medications    Medication Sig Taking?  Authorizing Provider   cefdinir (OMNICEF) 300 MG capsule Take 1 capsule by mouth 2 times daily for 10 days daily for 10 days  Dispense: 20 capsule; Refill: 0          Scribe attestation:I, Erin LOVETT, am scribing for and in the presence of Aarti Garrido MD. Electronically signed by Erin LOVETT, on 2/6/2020 at 6:55 PM      Provider attestation: Nicolasa Anderson MD, personally performed the services scribed by the user listed above in my presence, and it is both accurate and complete. I agree with the ROS and Past Histories independently gathered by the clinical support staff and the remaining scribed note accurately describes my personal service to the patient.     [facility]    2/6/2020  6:55 PM

## 2020-04-04 ENCOUNTER — NURSE TRIAGE (OUTPATIENT)
Dept: OTHER | Facility: CLINIC | Age: 35
End: 2020-04-04

## 2020-04-04 NOTE — TELEPHONE ENCOUNTER
Patient called in with tightness in chest and increased pain when trying to take a deep breath. Pt states that he has had this before when he had GERD. Pt denies GERD symptoms at this time. Pt to login into [facility] and get an e-visit with Dr. Octaviano Rodriguez. Reason for Disposition   [1] MODERATE longstanding difficulty breathing (e.g., speaks in phrases, SOB even at rest, pulse 100-120) AND [2] SAME as normal    Answer Assessment - Initial Assessment Questions  1. RESPIRATORY STATUS: \"Describe your breathing? \" (e.g., wheezing, shortness of breath, unable to speak, severe coughing)       Tightness in chest  2. ONSET: \"When did this breathing problem begin? \"       3-4 days ago  3. PATTERN \"Does the difficult breathing come and go, or has it been constant since it started? \"       constant  4. SEVERITY: \"How bad is your breathing? \" (e.g., mild, moderate, severe)     - MILD: No SOB at rest, mild SOB with walking, speaks normally in sentences, can lay down, no retractions, pulse < 100.     - MODERATE: SOB at rest, SOB with minimal exertion and prefers to sit, cannot lie down flat, speaks in phrases, mild retractions, audible wheezing, pulse 100-120.     - SEVERE: Very SOB at rest, speaks in single words, struggling to breathe, sitting hunched forward, retractions, pulse > 120       Mild   5. RECURRENT SYMPTOM: \"Have you had difficulty breathing before? \" If so, ask: \"When was the last time? \" and \"What happened that time? \"       Yes, prior GERD that caused this problem  6. CARDIAC HISTORY: \"Do you have any history of heart disease? \" (e.g., heart attack, angina, bypass surgery, angioplasty)       No  7. LUNG HISTORY: \"Do you have any history of lung disease? \"  (e.g., pulmonary embolus, asthma, emphysema)      asthma  8. CAUSE: \"What do you think is causing the breathing problem? \"       GERD maybe  9. OTHER SYMPTOMS: \"Do you have any other symptoms? (e.g., dizziness, runny nose, cough, chest pain, fever)      No  10.

## 2020-04-16 ENCOUNTER — PATIENT MESSAGE (OUTPATIENT)
Dept: FAMILY MEDICINE CLINIC | Age: 35
End: 2020-04-16

## 2020-04-17 RX ORDER — AZITHROMYCIN 250 MG/1
250 TABLET, FILM COATED ORAL SEE ADMIN INSTRUCTIONS
Qty: 1 PACKET | Refills: 0 | Status: SHIPPED | OUTPATIENT
Start: 2020-04-17 | End: 2020-04-22

## 2020-08-27 ENCOUNTER — OFFICE VISIT (OUTPATIENT)
Dept: FAMILY MEDICINE CLINIC | Age: 35
End: 2020-08-27
Payer: COMMERCIAL

## 2020-08-27 ENCOUNTER — NURSE TRIAGE (OUTPATIENT)
Dept: OTHER | Facility: CLINIC | Age: 35
End: 2020-08-27

## 2020-08-27 VITALS
SYSTOLIC BLOOD PRESSURE: 124 MMHG | BODY MASS INDEX: 28.59 KG/M2 | OXYGEN SATURATION: 98 % | WEIGHT: 205 LBS | TEMPERATURE: 97.8 F | DIASTOLIC BLOOD PRESSURE: 82 MMHG | HEART RATE: 92 BPM

## 2020-08-27 PROCEDURE — 1036F TOBACCO NON-USER: CPT | Performed by: NURSE PRACTITIONER

## 2020-08-27 PROCEDURE — 36415 COLL VENOUS BLD VENIPUNCTURE: CPT | Performed by: NURSE PRACTITIONER

## 2020-08-27 PROCEDURE — 99213 OFFICE O/P EST LOW 20 MIN: CPT | Performed by: NURSE PRACTITIONER

## 2020-08-27 PROCEDURE — G8419 CALC BMI OUT NRM PARAM NOF/U: HCPCS | Performed by: NURSE PRACTITIONER

## 2020-08-27 PROCEDURE — G8427 DOCREV CUR MEDS BY ELIG CLIN: HCPCS | Performed by: NURSE PRACTITIONER

## 2020-08-27 RX ORDER — OMEPRAZOLE 20 MG/1
20 CAPSULE, DELAYED RELEASE ORAL
Qty: 30 CAPSULE | Refills: 0 | Status: SHIPPED | OUTPATIENT
Start: 2020-08-27 | End: 2020-09-18 | Stop reason: SDUPTHER

## 2020-08-27 ASSESSMENT — ENCOUNTER SYMPTOMS
CHEST TIGHTNESS: 0
SINUS PRESSURE: 0
EYE DISCHARGE: 0
DIARRHEA: 0
BACK PAIN: 0
CONSTIPATION: 0
CHOKING: 0
PHOTOPHOBIA: 0
SHORTNESS OF BREATH: 0
BLOOD IN STOOL: 0
EYE REDNESS: 0
EYE ITCHING: 0
COUGH: 0
VOMITING: 0
NAUSEA: 0
STRIDOR: 0
ABDOMINAL PAIN: 0
SORE THROAT: 0
COLOR CHANGE: 0
EYE PAIN: 0
SINUS PAIN: 0
WHEEZING: 0
TROUBLE SWALLOWING: 1
RHINORRHEA: 0
VOICE CHANGE: 0

## 2020-08-27 NOTE — TELEPHONE ENCOUNTER
Reason for Disposition   Patient wants to be seen    Answer Assessment - Initial Assessment Questions  1. ONSET: \"When did the throat start hurting? \" (Hours or days ago)       3 months ago  2. SEVERITY: \"How bad is the sore throat? \" (Scale 1-10; mild, moderate or severe)    - MILD (1-3):  doesn't interfere with eating or normal activities    - MODERATE (4-7): interferes with eating some solids and normal activities    - SEVERE (8-10):  excruciating pain, interferes with most normal activities    - SEVERE DYSPHAGIA: can't swallow liquids, drooling      Moderate amount. He has to take his time eating sometimes to prevent choking  3. STREP EXPOSURE: \"Has there been any exposure to strep within the past week? \" If so, ask: \"What type of contact occurred? \"       none  4. VIRAL SYMPTOMS: Karyle Music there any symptoms of a cold, such as a runny nose, cough, hoarse voice or red eyes? \"       none  5. FEVER: \"Do you have a fever? \" If so, ask: \"What is your temperature, how was it measured, and when did it start? \"      none  6. PUS ON THE TONSILS: \"Is there pus on the tonsils in the back of your throat? \"      none  7. OTHER SYMPTOMS: \"Do you have any other symptoms? \" (e.g., difficulty breathing, headache, rash)      Some acid reflex issues,   8. PREGNANCY: \"Is there any chance you are pregnant? \" \"When was your last menstrual period? \"      Not asked    Protocols used: SORE THROAT-ADULT-[place]    Caller provided care advice and instructed to call back with worsening symptoms.

## 2020-08-27 NOTE — PROGRESS NOTES
Chief Complaint   Patient presents with    Dysphagia     trouble swallowing solid foods        /82   Pulse 92   Temp 97.8 °F (36.6 °C)   Wt 205 lb (93 kg)   SpO2 98%   BMI 28.59 kg/m²     HPI:  Chad Gardiner is a 28 y.o. (: 1985) here today   for   HPI    A couple months ago he started having swallowing issues. He can swallow liquids but solids will catch sometimes. Denies fever and chills. He has not had to throw up. He will drink when this occurs and it clear the sensation. He states his throat feels \"strange\". He had acid reflux in the past. He will take tums occassionally. He has some family members with issues swallowing. Patient's medications, allergies, past medical, surgical, social and family histories were reviewed and updated asappropriate. ROS:  Review of Systems   Constitutional: Negative for activity change, appetite change, chills, diaphoresis, fatigue, fever and unexpected weight change. HENT: Positive for trouble swallowing. Negative for congestion, ear discharge, ear pain, hearing loss, nosebleeds, postnasal drip, rhinorrhea, sinus pressure, sinus pain, sneezing, sore throat, tinnitus and voice change. Eyes: Negative for photophobia, pain, discharge, redness and itching. Respiratory: Negative for cough, choking, chest tightness, shortness of breath, wheezing and stridor. Cardiovascular: Negative for chest pain, palpitations and leg swelling. Gastrointestinal: Negative for abdominal pain, blood in stool, constipation, diarrhea, nausea and vomiting. Endocrine: Negative for cold intolerance, heat intolerance, polydipsia and polyuria. Genitourinary: Negative for difficulty urinating, dysuria, enuresis, flank pain, frequency, hematuria and urgency. Musculoskeletal: Negative for back pain, gait problem, joint swelling, neck pain and neck stiffness. Skin: Negative for color change, pallor, rash and wound.    Allergic/Immunologic: Negative for environmental allergies and food allergies. Neurological: Negative for dizziness, tremors, syncope, speech difficulty, weakness, light-headedness, numbness and headaches. Hematological: Negative for adenopathy. Does not bruise/bleed easily. Psychiatric/Behavioral: Negative for agitation, behavioral problems, confusion, decreased concentration, dysphoric mood, hallucinations, self-injury, sleep disturbance and suicidal ideas. The patient is not nervous/anxious and is not hyperactive. Prior to Visit Medications    Medication Sig Taking? Authorizing Provider   omeprazole (PRILOSEC) 20 MG delayed release capsule Take 1 capsule by mouth every morning (before breakfast) Yes Lizzie Combs, APRN - CNP   desipramine (NORPRAMIN) 25 MG tablet Take 25 mg by mouth daily Yes Historical Provider, MD       No Known Allergies    OBJECTIVE:      BP Readings from Last 2 Encounters:   08/27/20 124/82   02/06/20 124/70       Wt Readings from Last 3 Encounters:   08/27/20 205 lb (93 kg)   02/06/20 211 lb 3.2 oz (95.8 kg)   01/13/20 208 lb 6.4 oz (94.5 kg)       Physical Exam  Vitals signs reviewed. Constitutional:       General: He is not in acute distress. Appearance: Normal appearance. He is well-developed. HENT:      Head: Normocephalic and atraumatic. Right Ear: Hearing, tympanic membrane, ear canal and external ear normal.      Left Ear: Hearing, tympanic membrane, ear canal and external ear normal.      Nose: Nose normal.      Right Sinus: No maxillary sinus tenderness or frontal sinus tenderness. Left Sinus: No maxillary sinus tenderness or frontal sinus tenderness. Mouth/Throat:      Pharynx: No oropharyngeal exudate. Eyes:      General:         Right eye: No discharge. Left eye: No discharge. Conjunctiva/sclera: Conjunctivae normal.      Pupils: Pupils are equal, round, and reactive to light. Neck:      Musculoskeletal: Normal range of motion.       Thyroid: No thyromegaly. Vascular: No JVD. Trachea: No tracheal deviation. Cardiovascular:      Rate and Rhythm: Normal rate and regular rhythm. Heart sounds: Normal heart sounds. No murmur. No friction rub. Pulmonary:      Effort: Pulmonary effort is normal. No respiratory distress. Breath sounds: Normal breath sounds. No stridor. No decreased breath sounds, wheezing, rhonchi or rales. Musculoskeletal: Normal range of motion. General: No tenderness. Lymphadenopathy:      Cervical: No cervical adenopathy. Skin:     General: Skin is warm and dry. Capillary Refill: Capillary refill takes less than 2 seconds. Findings: No rash. Neurological:      Mental Status: He is alert and oriented to person, place, and time. Sensory: Sensation is intact. Motor: Motor function is intact. Coordination: Coordination normal.   Psychiatric:         Attention and Perception: Attention and perception normal.         Mood and Affect: Mood normal.         Speech: Speech normal.         Behavior: Behavior normal. Behavior is cooperative. Thought Content: Thought content normal.         Cognition and Memory: Cognition normal.         Judgment: Judgment normal.       ASSESSMENT/PLAN:    1. Esophageal dysphagia    - TSH without Reflex  - Comprehensive Metabolic Panel  - CBC Auto Differential  - omeprazole (PRILOSEC) 20 MG delayed release capsule; Take 1 capsule by mouth every morning (before breakfast)  Dispense: 30 capsule; Refill: 0  - T3, FREE    2. Throat fullness    - TSH without Reflex  - Comprehensive Metabolic Panel  - CBC Auto Differential  - T3, FREE    3. Gastroesophageal reflux disease with esophagitis    - omeprazole (PRILOSEC) 20 MG delayed release capsule; Take 1 capsule by mouth every morning (before breakfast)  Dispense: 30 capsule; Refill: 0    Plan: Will rule out thyroid nodule/growth by getting labs in office today.  He is also due for routine labs so will add these on. We will start one month of GERD treatment to see if this sensation improves. If not will consider ENT referral. He agreed to this plan and voiced understanding.

## 2020-08-28 LAB
A/G RATIO: 2.2 (ref 1.1–2.2)
ALBUMIN SERPL-MCNC: 4.8 G/DL (ref 3.4–5)
ALP BLD-CCNC: 51 U/L (ref 40–129)
ALT SERPL-CCNC: 19 U/L (ref 10–40)
ANION GAP SERPL CALCULATED.3IONS-SCNC: 11 MMOL/L (ref 3–16)
AST SERPL-CCNC: 23 U/L (ref 15–37)
BASOPHILS ABSOLUTE: 0 K/UL (ref 0–0.2)
BASOPHILS RELATIVE PERCENT: 0.6 %
BILIRUB SERPL-MCNC: 0.3 MG/DL (ref 0–1)
BUN BLDV-MCNC: 25 MG/DL (ref 7–20)
CALCIUM SERPL-MCNC: 9.8 MG/DL (ref 8.3–10.6)
CHLORIDE BLD-SCNC: 104 MMOL/L (ref 99–110)
CO2: 25 MMOL/L (ref 21–32)
CREAT SERPL-MCNC: 1.2 MG/DL (ref 0.9–1.3)
EOSINOPHILS ABSOLUTE: 0.1 K/UL (ref 0–0.6)
EOSINOPHILS RELATIVE PERCENT: 2.3 %
GFR AFRICAN AMERICAN: >60
GFR NON-AFRICAN AMERICAN: >60
GLOBULIN: 2.2 G/DL
GLUCOSE BLD-MCNC: 87 MG/DL (ref 70–99)
HCT VFR BLD CALC: 40 % (ref 40.5–52.5)
HEMOGLOBIN: 14.1 G/DL (ref 13.5–17.5)
LYMPHOCYTES ABSOLUTE: 1.5 K/UL (ref 1–5.1)
LYMPHOCYTES RELATIVE PERCENT: 24 %
MCH RBC QN AUTO: 31 PG (ref 26–34)
MCHC RBC AUTO-ENTMCNC: 35.2 G/DL (ref 31–36)
MCV RBC AUTO: 88.1 FL (ref 80–100)
MONOCYTES ABSOLUTE: 0.5 K/UL (ref 0–1.3)
MONOCYTES RELATIVE PERCENT: 7.9 %
NEUTROPHILS ABSOLUTE: 4 K/UL (ref 1.7–7.7)
NEUTROPHILS RELATIVE PERCENT: 65.2 %
PDW BLD-RTO: 12.6 % (ref 12.4–15.4)
PLATELET # BLD: 195 K/UL (ref 135–450)
PMV BLD AUTO: 8 FL (ref 5–10.5)
POTASSIUM SERPL-SCNC: 4.5 MMOL/L (ref 3.5–5.1)
RBC # BLD: 4.54 M/UL (ref 4.2–5.9)
SODIUM BLD-SCNC: 140 MMOL/L (ref 136–145)
T3 FREE: 3.2 PG/ML (ref 2.3–4.2)
TOTAL PROTEIN: 7 G/DL (ref 6.4–8.2)
TSH SERPL DL<=0.05 MIU/L-ACNC: 2.13 UIU/ML (ref 0.27–4.2)
WBC # BLD: 6.1 K/UL (ref 4–11)

## 2020-09-18 ENCOUNTER — OFFICE VISIT (OUTPATIENT)
Dept: ENT CLINIC | Age: 35
End: 2020-09-18
Payer: COMMERCIAL

## 2020-09-18 VITALS
BODY MASS INDEX: 29.43 KG/M2 | HEIGHT: 71 IN | DIASTOLIC BLOOD PRESSURE: 74 MMHG | WEIGHT: 210.2 LBS | HEART RATE: 90 BPM | SYSTOLIC BLOOD PRESSURE: 119 MMHG | TEMPERATURE: 97.4 F

## 2020-09-18 PROCEDURE — 1036F TOBACCO NON-USER: CPT | Performed by: OTOLARYNGOLOGY

## 2020-09-18 PROCEDURE — G8419 CALC BMI OUT NRM PARAM NOF/U: HCPCS | Performed by: OTOLARYNGOLOGY

## 2020-09-18 PROCEDURE — G8427 DOCREV CUR MEDS BY ELIG CLIN: HCPCS | Performed by: OTOLARYNGOLOGY

## 2020-09-18 PROCEDURE — 99203 OFFICE O/P NEW LOW 30 MIN: CPT | Performed by: OTOLARYNGOLOGY

## 2020-09-18 RX ORDER — OMEPRAZOLE 20 MG/1
20 CAPSULE, DELAYED RELEASE ORAL
Qty: 30 CAPSULE | Refills: 1 | Status: SHIPPED | OUTPATIENT
Start: 2020-09-18 | End: 2021-08-24

## 2020-09-18 ASSESSMENT — ENCOUNTER SYMPTOMS
APNEA: 0
COUGH: 0
EYE ITCHING: 0
FACIAL SWELLING: 0
TROUBLE SWALLOWING: 1
SORE THROAT: 0
VOICE CHANGE: 0
SHORTNESS OF BREATH: 0
SINUS PRESSURE: 0

## 2020-09-18 NOTE — PROGRESS NOTES
Darci Graves 89, [address], [address] [place] [address]  P: [phone]       Patient     Leobardo Callahan  1985    ChiefComplaint     Chief Complaint   Patient presents with    Dysphagia     Over the last couple of months, has had some issues with swallowing some foods. Is currently on Acid reflux medications and does seem to help. Has not noticed any acid reflux during the day so assumes it happens in his sleep. History of Present Illness     Asaf De Leon is a 80-year-old male here today for evaluation regarding reflux and globus sensation. He reports for last several months he has a sensation of swelling over something in his throat. Was evaluated with by PCP who felt symptoms were related to reflux and started him on omeprazole 20 mg at the end of August.  He reports for the last 2 to 3 weeks significant improvement in the sensation. Has history of severe reflux approximately 10 years ago for which she was on medications for several years. Denies weight loss, regurgitation, odynophagia, hemoptysis. No history of smoking or daily alcohol use.   Past Medical History     Past Medical History:   Diagnosis Date    GERD (gastroesophageal reflux disease)     Headache(784.0)        Past Surgical History     Past Surgical History:   Procedure Laterality Date    ANTERIOR CRUCIATE LIGAMENT REPAIR      KNEE ARTHROPLASTY      KNEE ARTHROSCOPY         Family History     Family History   Problem Relation Age of Onset    Cancer Maternal Grandmother         ovarian    Cancer Mother        Social History     Social History     Tobacco Use    Smoking status: Never Smoker    Smokeless tobacco: Never Used   Substance Use Topics    Alcohol use: No    Drug use: No        Allergies     No Known Allergies    Medications     Current Outpatient Medications   Medication Sig Dispense Refill    omeprazole (PRILOSEC) 20 MG delayed release capsule Take 1 capsule by mouth every morning (before breakfast) 30 capsule 1    desipramine (NORPRAMIN) 25 MG tablet Take 25 mg by mouth daily       No current facility-administered medications for this visit. Review of Systems     Review of Systems   Constitutional: Negative for appetite change, chills, fatigue, fever and unexpected weight change. HENT: Positive for trouble swallowing. Negative for congestion, ear discharge, ear pain, facial swelling, hearing loss, nosebleeds, postnasal drip, sinus pressure, sneezing, sore throat, tinnitus and voice change. Eyes: Negative for itching. Respiratory: Negative for apnea, cough and shortness of breath. Gastrointestinal:        +reflux   Endocrine: Negative for cold intolerance and heat intolerance. Musculoskeletal: Negative for myalgias and neck pain. Skin: Negative for rash. Allergic/Immunologic: Negative for environmental allergies. Neurological: Negative for dizziness and headaches. Psychiatric/Behavioral: Negative for confusion, decreased concentration and sleep disturbance. PhysicalExam     Vitals:    09/18/20 1409   BP: 119/74   Site: Left Upper Arm   Position: Sitting   Cuff Size: Large Adult   Pulse: 90   Temp: 97.4 °F (36.3 °C)   TempSrc: Infrared   Weight: 210 lb 3.2 oz (95.3 kg)   Height: 5' 11\" (1.803 m)       Physical Exam  Constitutional:       General: He is not in acute distress. Appearance: He is well-developed. HENT:      Head: Normocephalic and atraumatic. Right Ear: Tympanic membrane, ear canal and external ear normal. No drainage. No middle ear effusion. Tympanic membrane is not bulging. Tympanic membrane has normal mobility. Left Ear: Tympanic membrane, ear canal and external ear normal. No drainage. No middle ear effusion. Tympanic membrane is not bulging. Tympanic membrane has normal mobility. Nose: Septal deviation (right anterior) present. No mucosal edema or rhinorrhea. Mouth/Throat:      Lips: Pink.       Mouth: Mucous membranes are moist.      Tongue: No lesions. Palate: No mass. Pharynx: Uvula midline. Tonsils: No tonsillar exudate. 1+ on the right. 1+ on the left. Comments: Mirror exam was performed. The nasopharynx, larynx,or hypopharynx were examined. Vocal fold motion was examined. Pertinent findings include: normal  Eyes:      Pupils: Pupils are equal, round, and reactive to light. Neck:      Musculoskeletal: Full passive range of motion without pain. Thyroid: No thyroid mass or thyromegaly. Trachea: Trachea and phonation normal.   Cardiovascular:      Pulses: Normal pulses. Pulmonary:      Effort: Pulmonary effort is normal. No accessory muscle usage or respiratory distress. Breath sounds: No stridor. Lymphadenopathy:      Head:      Right side of head: No submental or submandibular adenopathy. Left side of head: No submental or submandibular adenopathy. Cervical: No cervical adenopathy. Right cervical: No superficial, deep or posterior cervical adenopathy. Left cervical: No superficial, deep or posterior cervical adenopathy. Skin:     General: Skin is warm and dry. Neurological:      Mental Status: He is alert and oriented to person, place, and time. Cranial Nerves: No cranial nerve deficit. Coordination: Coordination normal.      Gait: Gait normal.   Psychiatric:         Thought Content: Thought content normal.           Assessment and Plan     1. Gastroesophageal reflux disease  -Significant improvement since starting on oral medication  - omeprazole (PRILOSEC) 20 MG delayed release capsule; Take 1 capsule by mouth every morning (before breakfast)  Dispense: 30 capsule; Refill: 1    2. Globus sensation  -Significant improvement since starting on oral medication  - omeprazole (PRILOSEC) 20 MG delayed release capsule; Take 1 capsule by mouth every morning (before breakfast)  Dispense: 30 capsule; Refill: 1      Continue on PPI for additional 1 month.   If symptoms continue to be improved/resolved he will try weaning from medication. If symptoms then return he will follow-up with GI for evaluation. Christian Mejía DO  9/18/20      Portions of this note were dictated using Dragon.  There may be linguistic errors secondary to the use of this program.

## 2021-08-24 ENCOUNTER — OFFICE VISIT (OUTPATIENT)
Dept: FAMILY MEDICINE CLINIC | Age: 36
End: 2021-08-24
Payer: COMMERCIAL

## 2021-08-24 VITALS
HEART RATE: 92 BPM | DIASTOLIC BLOOD PRESSURE: 70 MMHG | SYSTOLIC BLOOD PRESSURE: 136 MMHG | WEIGHT: 209 LBS | BODY MASS INDEX: 29.26 KG/M2 | OXYGEN SATURATION: 96 % | HEIGHT: 71 IN

## 2021-08-24 DIAGNOSIS — R53.83 OTHER FATIGUE: ICD-10-CM

## 2021-08-24 DIAGNOSIS — R09.89 GLOBUS SENSATION: ICD-10-CM

## 2021-08-24 DIAGNOSIS — N52.9 ERECTILE DYSFUNCTION, UNSPECIFIED ERECTILE DYSFUNCTION TYPE: ICD-10-CM

## 2021-08-24 DIAGNOSIS — K21.9 GASTROESOPHAGEAL REFLUX DISEASE, UNSPECIFIED WHETHER ESOPHAGITIS PRESENT: ICD-10-CM

## 2021-08-24 DIAGNOSIS — E29.1 HYPOGONADISM MALE: Primary | ICD-10-CM

## 2021-08-24 DIAGNOSIS — R51.9 NONINTRACTABLE HEADACHE, UNSPECIFIED CHRONICITY PATTERN, UNSPECIFIED HEADACHE TYPE: ICD-10-CM

## 2021-08-24 LAB
BASOPHILS ABSOLUTE: 0 K/UL (ref 0–0.2)
BASOPHILS RELATIVE PERCENT: 0.6 %
EOSINOPHILS ABSOLUTE: 0.2 K/UL (ref 0–0.6)
EOSINOPHILS RELATIVE PERCENT: 2.4 %
HCT VFR BLD CALC: 41.6 % (ref 40.5–52.5)
HEMOGLOBIN: 14.4 G/DL (ref 13.5–17.5)
LYMPHOCYTES ABSOLUTE: 1.4 K/UL (ref 1–5.1)
LYMPHOCYTES RELATIVE PERCENT: 17.5 %
MCH RBC QN AUTO: 30.3 PG (ref 26–34)
MCHC RBC AUTO-ENTMCNC: 34.6 G/DL (ref 31–36)
MCV RBC AUTO: 87.6 FL (ref 80–100)
MONOCYTES ABSOLUTE: 0.4 K/UL (ref 0–1.3)
MONOCYTES RELATIVE PERCENT: 5.2 %
NEUTROPHILS ABSOLUTE: 6 K/UL (ref 1.7–7.7)
NEUTROPHILS RELATIVE PERCENT: 74.3 %
PDW BLD-RTO: 12.9 % (ref 12.4–15.4)
PLATELET # BLD: 213 K/UL (ref 135–450)
PMV BLD AUTO: 8.3 FL (ref 5–10.5)
RBC # BLD: 4.74 M/UL (ref 4.2–5.9)
WBC # BLD: 8 K/UL (ref 4–11)

## 2021-08-24 PROCEDURE — 99214 OFFICE O/P EST MOD 30 MIN: CPT | Performed by: FAMILY MEDICINE

## 2021-08-24 PROCEDURE — 36415 COLL VENOUS BLD VENIPUNCTURE: CPT | Performed by: FAMILY MEDICINE

## 2021-08-24 PROCEDURE — G8419 CALC BMI OUT NRM PARAM NOF/U: HCPCS | Performed by: FAMILY MEDICINE

## 2021-08-24 PROCEDURE — 1036F TOBACCO NON-USER: CPT | Performed by: FAMILY MEDICINE

## 2021-08-24 PROCEDURE — G8427 DOCREV CUR MEDS BY ELIG CLIN: HCPCS | Performed by: FAMILY MEDICINE

## 2021-08-24 RX ORDER — SILDENAFIL CITRATE 20 MG/1
20 TABLET ORAL DAILY PRN
Qty: 30 TABLET | Refills: 0 | Status: SHIPPED | OUTPATIENT
Start: 2021-08-24 | End: 2021-08-24

## 2021-08-24 RX ORDER — SILDENAFIL CITRATE 20 MG/1
20 TABLET ORAL DAILY PRN
Qty: 30 TABLET | Refills: 0 | Status: SHIPPED | OUTPATIENT
Start: 2021-08-24 | End: 2022-11-01

## 2021-08-24 RX ORDER — OMEPRAZOLE 20 MG/1
20 CAPSULE, DELAYED RELEASE ORAL
Qty: 30 CAPSULE | Refills: 1 | Status: SHIPPED | OUTPATIENT
Start: 2021-08-24 | End: 2022-10-18

## 2021-08-24 SDOH — ECONOMIC STABILITY: FOOD INSECURITY: WITHIN THE PAST 12 MONTHS, THE FOOD YOU BOUGHT JUST DIDN'T LAST AND YOU DIDN'T HAVE MONEY TO GET MORE.: NEVER TRUE

## 2021-08-24 SDOH — ECONOMIC STABILITY: FOOD INSECURITY: WITHIN THE PAST 12 MONTHS, YOU WORRIED THAT YOUR FOOD WOULD RUN OUT BEFORE YOU GOT MONEY TO BUY MORE.: NEVER TRUE

## 2021-08-24 ASSESSMENT — ENCOUNTER SYMPTOMS
ABDOMINAL PAIN: 0
CHEST TIGHTNESS: 0
HEARTBURN: 1
BLOOD IN STOOL: 0
DIARRHEA: 0
COUGH: 0
CONSTIPATION: 0
SORE THROAT: 0
NAUSEA: 0
BELCHING: 0
HOARSE VOICE: 0

## 2021-08-24 ASSESSMENT — SOCIAL DETERMINANTS OF HEALTH (SDOH): HOW HARD IS IT FOR YOU TO PAY FOR THE VERY BASICS LIKE FOOD, HOUSING, MEDICAL CARE, AND HEATING?: NOT HARD AT ALL

## 2021-08-24 ASSESSMENT — PATIENT HEALTH QUESTIONNAIRE - PHQ9
SUM OF ALL RESPONSES TO PHQ9 QUESTIONS 1 & 2: 0
SUM OF ALL RESPONSES TO PHQ QUESTIONS 1-9: 0
SUM OF ALL RESPONSES TO PHQ QUESTIONS 1-9: 0
1. LITTLE INTEREST OR PLEASURE IN DOING THINGS: 0
2. FEELING DOWN, DEPRESSED OR HOPELESS: 0
SUM OF ALL RESPONSES TO PHQ QUESTIONS 1-9: 0

## 2021-08-24 NOTE — PROGRESS NOTES
Chief Complaint   Patient presents with    Gastroesophageal Reflux       HPI:  Jose Landaverde is a 39 y.o. (: 1985) here today   for   Gastroesophageal Reflux  He complains of heartburn. He reports no abdominal pain, no belching, no chest pain, no coughing, no hoarse voice, no nausea or no sore throat. This is a recurrent problem. The current episode started more than 1 month ago. The problem occurs occasionally. The problem has been waxing and waning. The heartburn duration is several minutes. The heartburn is located in the abdomen. The heartburn is of mild intensity. The heartburn wakes him from sleep. The heartburn does not limit his activity. The heartburn doesn't change with position. The symptoms are aggravated by lying down and certain foods. Pertinent negatives include no fatigue. There are no known risk factors. He has tried an antacid for the symptoms. The treatment provided mild relief. was having issues w/ reflux. sxs last mo. Took otc prevacid. Some improvement of sxs. No swallowing issues at this time. Has not had upper endoscopy. Has had issues w/ ED over past several weeks. Some inc stress w/ work and and changes in sleep pattern. Has been trying to sleep more. Has had some inc fatigue as well. Worse over past few weeks. Trying to exercise more as well. Has been on desipramine for HA. Has seen neurology. [facility] meds. Patient's medications, allergies, past medical, surgical, social and family histories were reviewed and updated as appropriate. ROS:  Review of Systems   Constitutional: Negative for chills, fatigue and fever. HENT: Negative for hoarse voice and sore throat. Respiratory: Negative for cough and chest tightness. Cardiovascular: Negative for chest pain and palpitations. Gastrointestinal: Positive for heartburn. Negative for abdominal pain, blood in stool, constipation, diarrhea and nausea.    Neurological: Negative for dizziness, light-headedness and headaches. Prior to Visit Medications    Medication Sig Taking? Authorizing Provider   vitamin D (CHOLECALCIFEROL) 25 MCG (1000 UT) TABS tablet Take 1,000 Units by mouth daily Yes Historical Provider, MD   Glucos-Chond-Hyal Ac-Ca Fructo (MOVE FREE JOINT HEALTH ADVANCE PO) Take by mouth Yes Historical Provider, MD   UNABLE TO FIND [employer] vitamin mix Yes Historical Provider, MD   omeprazole (PRILOSEC) 20 MG delayed release capsule Take 1 capsule by mouth every morning (before breakfast) Yes Chadd Myers MD   sildenafil (REVATIO) 20 MG tablet Take 1 tablet by mouth daily as needed (ED) Yes Chadd Myers MD   desipramine (NORPRAMIN) 25 MG tablet Take 25 mg by mouth daily Yes Historical Provider, MD       No Known Allergies    OBJECTIVE:    /70   Pulse 92   Ht 5' 11\" (1.803 m)   Wt 209 lb (94.8 kg)   SpO2 96%   BMI 29.15 kg/m²     BP Readings from Last 2 Encounters:   08/24/21 136/70   09/18/20 119/74       Wt Readings from Last 3 Encounters:   08/24/21 209 lb (94.8 kg)   09/18/20 210 lb 3.2 oz (95.3 kg)   08/27/20 205 lb (93 kg)        Physical Exam  Constitutional:       Appearance: Normal appearance. HENT:      Head: Normocephalic and atraumatic. Eyes:      Extraocular Movements: Extraocular movements intact. Cardiovascular:      Rate and Rhythm: Normal rate and regular rhythm. Pulmonary:      Effort: Pulmonary effort is normal.      Breath sounds: Normal breath sounds. Abdominal:      Palpations: Abdomen is soft. Tenderness: There is no abdominal tenderness. Neurological:      General: No focal deficit present. Mental Status: He is alert and oriented to person, place, and time. Psychiatric:         Mood and Affect: Mood normal.         Behavior: Behavior normal.           ASSESSMENT/PLAN:    1. Hypogonadism male  Hx of low T in the past.  Rpt level. sxs as below. Fatigue and ED noted    - Testosterone, free, total    2.  Nonintractable headache, unspecified chronicity pattern, unspecified headache type  Well controlled w/ med, but desipramine could have ED and dec libido as SE. Considering trying to cut med in half. 3. Other fatigue  Rpt labs as below. Suspect related to inc stress   - T4, Free  - TSH without Reflex  - Comprehensive Metabolic Panel  - CBC Auto Differential  - Vitamin B12    4. Erectile dysfunction, unspecified erectile dysfunction type  See above. Trial of low dose sildenafil   - Testosterone, free, total    5. Globus sensation  Improved overall. Refill PPI. - omeprazole (PRILOSEC) 20 MG delayed release capsule; Take 1 capsule by mouth every morning (before breakfast)  Dispense: 30 capsule; Refill: 1    6. Gastroesophageal reflux disease, unspecified whether esophagitis present  See prior ent note. Given recurrent sxs, GI referral for possible egd. - omeprazole (PRILOSEC) 20 MG delayed release capsule; Take 1 capsule by mouth every morning (before breakfast)  Dispense: 30 capsule;  Refill: 1  - [facility] - Sara Yi MD, Gastroenterology, [facility]

## 2021-08-25 ENCOUNTER — TELEPHONE (OUTPATIENT)
Dept: FAMILY MEDICINE CLINIC | Age: 36
End: 2021-08-25

## 2021-08-25 LAB
A/G RATIO: 2.2 (ref 1.1–2.2)
ALBUMIN SERPL-MCNC: 4.8 G/DL (ref 3.4–5)
ALP BLD-CCNC: 57 U/L (ref 40–129)
ALT SERPL-CCNC: 15 U/L (ref 10–40)
ANION GAP SERPL CALCULATED.3IONS-SCNC: 13 MMOL/L (ref 3–16)
AST SERPL-CCNC: 20 U/L (ref 15–37)
BILIRUB SERPL-MCNC: <0.2 MG/DL (ref 0–1)
BUN BLDV-MCNC: 25 MG/DL (ref 7–20)
CALCIUM SERPL-MCNC: 10.1 MG/DL (ref 8.3–10.6)
CHLORIDE BLD-SCNC: 101 MMOL/L (ref 99–110)
CO2: 24 MMOL/L (ref 21–32)
CREAT SERPL-MCNC: 1.3 MG/DL (ref 0.9–1.3)
GFR AFRICAN AMERICAN: >60
GFR NON-AFRICAN AMERICAN: >60
GLOBULIN: 2.2 G/DL
GLUCOSE BLD-MCNC: 89 MG/DL (ref 70–99)
POTASSIUM SERPL-SCNC: 4.5 MMOL/L (ref 3.5–5.1)
SODIUM BLD-SCNC: 138 MMOL/L (ref 136–145)
T4 FREE: 1 NG/DL (ref 0.9–1.8)
TOTAL PROTEIN: 7 G/DL (ref 6.4–8.2)
TSH SERPL DL<=0.05 MIU/L-ACNC: 1.82 UIU/ML (ref 0.27–4.2)
VITAMIN B-12: 782 PG/ML (ref 211–911)

## 2021-08-25 NOTE — TELEPHONE ENCOUNTER
----- Message from Willie Batres MD sent at 8/25/2021  8:53 AM EDT -----  B12 normal.  Cmp essentially normal.  Cbc and thyroid labs normal.  Testosterone pending.

## 2021-08-26 LAB
SEX HORMONE BINDING GLOBULIN: 20 NMOL/L (ref 11–80)
TESTOSTERONE FREE-NONMALE: 92.3 PG/ML (ref 47–244)
TESTOSTERONE TOTAL: 356 NG/DL (ref 220–1000)

## 2022-10-18 ENCOUNTER — OFFICE VISIT (OUTPATIENT)
Dept: FAMILY MEDICINE CLINIC | Age: 37
End: 2022-10-18
Payer: COMMERCIAL

## 2022-10-18 VITALS
DIASTOLIC BLOOD PRESSURE: 82 MMHG | HEART RATE: 88 BPM | OXYGEN SATURATION: 98 % | WEIGHT: 210 LBS | SYSTOLIC BLOOD PRESSURE: 124 MMHG | BODY MASS INDEX: 29.29 KG/M2

## 2022-10-18 DIAGNOSIS — G44.221 CHRONIC TENSION-TYPE HEADACHE, INTRACTABLE: Primary | ICD-10-CM

## 2022-10-18 DIAGNOSIS — F41.9 ANXIETY: ICD-10-CM

## 2022-10-18 PROCEDURE — G8484 FLU IMMUNIZE NO ADMIN: HCPCS

## 2022-10-18 PROCEDURE — 99214 OFFICE O/P EST MOD 30 MIN: CPT

## 2022-10-18 PROCEDURE — 1036F TOBACCO NON-USER: CPT

## 2022-10-18 PROCEDURE — G8427 DOCREV CUR MEDS BY ELIG CLIN: HCPCS

## 2022-10-18 PROCEDURE — G8419 CALC BMI OUT NRM PARAM NOF/U: HCPCS

## 2022-10-18 RX ORDER — POLYMYXIN B SULFATE AND TRIMETHOPRIM 1; 10000 MG/ML; [USP'U]/ML
1 SOLUTION OPHTHALMIC EVERY 4 HOURS
COMMUNITY
End: 2022-11-01

## 2022-10-18 RX ORDER — PANTOPRAZOLE SODIUM 40 MG/1
TABLET, DELAYED RELEASE ORAL DAILY
COMMUNITY
Start: 2022-07-22

## 2022-10-18 RX ORDER — HYDROXYZINE HYDROCHLORIDE 25 MG/1
25 TABLET, FILM COATED ORAL 2 TIMES DAILY PRN
Qty: 60 TABLET | Refills: 1 | Status: SHIPPED | OUTPATIENT
Start: 2022-10-18 | End: 2022-12-17

## 2022-10-18 RX ORDER — TIZANIDINE 4 MG/1
4 TABLET ORAL 2 TIMES DAILY
COMMUNITY
End: 2022-11-01

## 2022-10-18 ASSESSMENT — PATIENT HEALTH QUESTIONNAIRE - PHQ9
2. FEELING DOWN, DEPRESSED OR HOPELESS: 1
SUM OF ALL RESPONSES TO PHQ QUESTIONS 1-9: 2
SUM OF ALL RESPONSES TO PHQ9 QUESTIONS 1 & 2: 2
SUM OF ALL RESPONSES TO PHQ QUESTIONS 1-9: 2
1. LITTLE INTEREST OR PLEASURE IN DOING THINGS: 1
SUM OF ALL RESPONSES TO PHQ QUESTIONS 1-9: 2
SUM OF ALL RESPONSES TO PHQ QUESTIONS 1-9: 2

## 2022-10-18 ASSESSMENT — ENCOUNTER SYMPTOMS
GASTROINTESTINAL NEGATIVE: 1
RESPIRATORY NEGATIVE: 1

## 2022-10-18 NOTE — PROGRESS NOTES
Chief Complaint   Patient presents with    Headache       HPI:  Jade Wong is a 40 y.o. (: 1985) here today   for evaluation of tension headaches. Currently takes Zanaflex 4 mg twice daily and Desipramine 25mg daily for his tension headaches. Is following Neuro through [address] [place] [address] for management of his tension headaches. Last saw a month ago. The Zanaflex was prescribed recent and only started taking yesterday. Getting neck tension with headaches. Is going through a divorce and is contributing to his recent increase in headaches. Feels the anxiety with the whole situation is causing added stress which is amplifying his conditions. Normally works out and plays basketball to cope with stress. Has not been able to as much recent due to increased headaches. Patient's medications, allergies, past medical, surgical, social and family histories were reviewed and updated asappropriate. ROS:  Review of Systems   Constitutional: Negative. HENT: Negative. Respiratory: Negative. Cardiovascular: Negative. Gastrointestinal: Negative. Musculoskeletal:  Positive for neck stiffness. Neurological:  Positive for headaches. Psychiatric/Behavioral: Negative. Prior to Visit Medications    Medication Sig Taking?  Authorizing Provider   pantoprazole (PROTONIX) 40 MG tablet Take by mouth daily Yes Historical Provider, MD   tiZANidine (ZANAFLEX) 4 MG tablet Take 4 mg by mouth in the morning and at bedtime Yes Historical Provider, MD   trimethoprim-polymyxin b (POLYTRIM) 89851-1.1 UNIT/ML-% ophthalmic solution Place 1 drop into both eyes every 4 hours Yes Historical Provider, MD   hydrOXYzine HCl (ATARAX) 25 MG tablet Take 1 tablet by mouth 2 times daily as needed for Anxiety Yes Meribeth Ax, APRN - CNP   Glucos-Chond-Hyal Ac-Ca Fructo ([address]) Take by mouth Yes Historical Provider, MD   desipramine (NORPRAMIN) 25 MG tablet Take 25 mg by mouth daily Yes Historical Provider, MD   vitamin D (CHOLECALCIFEROL) 25 MCG (1000 UT) TABS tablet Take 1,000 Units by mouth daily  Patient not taking: Reported on 10/18/2022  Historical Provider, MD   [address] vitamin mix  Patient not taking: Reported on 10/18/2022  Historical Provider, MD   sildenafil (REVATIO) 20 MG tablet Take 1 tablet by mouth daily as needed (ED)  Warren Chowdhury MD       No Known Allergies    OBJECTIVE:    /82   Pulse 88   Wt 210 lb (95.3 kg)   SpO2 98%   BMI 29.29 kg/m²     BP Readings from Last 2 Encounters:   10/18/22 124/82   08/24/21 136/70       Wt Readings from Last 3 Encounters:   10/18/22 210 lb (95.3 kg)   08/24/21 209 lb (94.8 kg)   09/18/20 210 lb 3.2 oz (95.3 kg)       Physical Exam  Constitutional:       Appearance: Normal appearance. HENT:      Head: Normocephalic and atraumatic. Cardiovascular:      Rate and Rhythm: Normal rate and regular rhythm. Pulses: Normal pulses. Heart sounds: Normal heart sounds. Pulmonary:      Effort: Pulmonary effort is normal.      Breath sounds: Normal breath sounds. No wheezing. Abdominal:      General: Bowel sounds are normal.   Musculoskeletal:         General: Normal range of motion. Right lower leg: No edema. Left lower leg: No edema. Skin:     Capillary Refill: Capillary refill takes less than 2 seconds. Neurological:      General: No focal deficit present. Mental Status: He is alert and oriented to person, place, and time. Psychiatric:         Mood and Affect: Mood normal.         Behavior: Behavior normal.         ASSESSMENT/PLAN:    1. Chronic tension-type headache, intractable  Following neurology through [facility].  Recently had medication adjustment. His headaches have been more frequent related to anxiety due to process of going through divorce. 2. Anxiety  See above HPI. I discussed multiple options for treatment regarding the patient anxiety symptoms today.   We discussed as needed usage versus daily usage as well as mixture between the 2. We also discussed speaking with our licensed social worker Pool Dyson. We also discussed the importance of coping mechanisms for stress alleviation and anxiety suppression. Ultimately the patient elected to trial as needed medication at this time therefore hydroxyzine was ordered. Patient will follow up with office in 1 month for evaluation of symptoms. Patient was advised to contact the office in the interim for any worsening anxiety symptoms. - hydrOXYzine HCl (ATARAX) 25 MG tablet; Take 1 tablet by mouth 2 times daily as needed for Anxiety  Dispense: 60 tablet; Refill: 1    30 min spent on pt care addressing his anx and discussing all treatment options. Will follow back up if want so to speak with Pool Dyson the LSW. This document was prepared by a combination of typing and transcription through a voice recognition software.     Carol Tomas, URSULA - CNP

## 2022-11-01 ENCOUNTER — OFFICE VISIT (OUTPATIENT)
Dept: FAMILY MEDICINE CLINIC | Age: 37
End: 2022-11-01
Payer: COMMERCIAL

## 2022-11-01 VITALS
DIASTOLIC BLOOD PRESSURE: 76 MMHG | HEIGHT: 71 IN | HEART RATE: 102 BPM | SYSTOLIC BLOOD PRESSURE: 122 MMHG | BODY MASS INDEX: 29.03 KG/M2 | OXYGEN SATURATION: 97 % | WEIGHT: 207.4 LBS

## 2022-11-01 DIAGNOSIS — F43.23 ADJUSTMENT DISORDER WITH MIXED ANXIETY AND DEPRESSED MOOD: ICD-10-CM

## 2022-11-01 DIAGNOSIS — F41.9 ANXIETY: Primary | ICD-10-CM

## 2022-11-01 DIAGNOSIS — G44.221 CHRONIC TENSION-TYPE HEADACHE, INTRACTABLE: ICD-10-CM

## 2022-11-01 PROCEDURE — G8427 DOCREV CUR MEDS BY ELIG CLIN: HCPCS | Performed by: FAMILY MEDICINE

## 2022-11-01 PROCEDURE — 1036F TOBACCO NON-USER: CPT | Performed by: FAMILY MEDICINE

## 2022-11-01 PROCEDURE — G8419 CALC BMI OUT NRM PARAM NOF/U: HCPCS | Performed by: FAMILY MEDICINE

## 2022-11-01 PROCEDURE — G8484 FLU IMMUNIZE NO ADMIN: HCPCS | Performed by: FAMILY MEDICINE

## 2022-11-01 PROCEDURE — 99214 OFFICE O/P EST MOD 30 MIN: CPT | Performed by: FAMILY MEDICINE

## 2022-11-15 ENCOUNTER — OFFICE VISIT (OUTPATIENT)
Dept: FAMILY MEDICINE CLINIC | Age: 37
End: 2022-11-15
Payer: COMMERCIAL

## 2022-11-15 VITALS
WEIGHT: 210 LBS | OXYGEN SATURATION: 97 % | HEART RATE: 84 BPM | BODY MASS INDEX: 29.29 KG/M2 | DIASTOLIC BLOOD PRESSURE: 62 MMHG | SYSTOLIC BLOOD PRESSURE: 110 MMHG

## 2022-11-15 DIAGNOSIS — F43.23 ADJUSTMENT DISORDER WITH MIXED ANXIETY AND DEPRESSED MOOD: Primary | ICD-10-CM

## 2022-11-15 PROCEDURE — 99213 OFFICE O/P EST LOW 20 MIN: CPT

## 2022-11-15 PROCEDURE — G8427 DOCREV CUR MEDS BY ELIG CLIN: HCPCS

## 2022-11-15 PROCEDURE — 1036F TOBACCO NON-USER: CPT

## 2022-11-15 PROCEDURE — G8484 FLU IMMUNIZE NO ADMIN: HCPCS

## 2022-11-15 PROCEDURE — G8419 CALC BMI OUT NRM PARAM NOF/U: HCPCS

## 2022-11-15 RX ORDER — TOPIRAMATE 25 MG/1
TABLET ORAL
COMMUNITY
Start: 2022-10-26

## 2022-11-15 RX ORDER — BUPROPION HYDROCHLORIDE 150 MG/1
150 TABLET ORAL EVERY MORNING
Qty: 30 TABLET | Refills: 3 | Status: SHIPPED | OUTPATIENT
Start: 2022-11-15

## 2022-11-15 ASSESSMENT — ENCOUNTER SYMPTOMS
GASTROINTESTINAL NEGATIVE: 1
RESPIRATORY NEGATIVE: 1

## 2022-11-15 NOTE — PROGRESS NOTES
Chief Complaint   Patient presents with    Depression       HPI:  Ambrosio Qiu is a 40 y.o. (: 1985) here today   for follow-up depression/anx. Having issues chest tightness at times when having increased anxiety. Feels real stressed and gets irritated easily. Lack of motivation. Doesn't sleep very well. Did not have success with hydroxyzine. States Dr. Garcia discussed trialing Trintellix potentially but told him insurance coverage is hard to obtain without multiple failures of other depression/anxiety medication. Seeing neuro for headaches. Took pt off  desipramine recent. Patient's medications, allergies, past medical, surgical, social and family histories were reviewed and updated asappropriate. ROS:  Review of Systems   Constitutional: Negative. HENT: Negative. Respiratory: Negative. Cardiovascular: Negative. Gastrointestinal: Negative. Neurological:  Positive for headaches. Psychiatric/Behavioral:  Positive for dysphoric mood and sleep disturbance. The patient is nervous/anxious. Prior to Visit Medications    Medication Sig Taking?  Authorizing Provider   topiramate (TOPAMAX) 25 MG tablet TAKE 1 TAB AT BEDTIME FOR 7 DAYS, THEN 2 TWO TABS AT BEDTIME FOR 7 DAYS, THEN 1 TABLET IN THE MORNING & 2 TABS IN THE EVENING, THEN 2 TABS TWICE DAILY Yes Historical Provider, MD   buPROPion (WELLBUTRIN XL) 150 MG extended release tablet Take 1 tablet by mouth every morning Yes URSULA Phillips - CNP   pantoprazole (PROTONIX) 40 MG tablet Take by mouth daily Yes Historical Provider, MD       No Known Allergies    OBJECTIVE:    /62   Pulse 84   Wt 210 lb (95.3 kg)   SpO2 97%   BMI 29.29 kg/m²     BP Readings from Last 2 Encounters:   11/15/22 110/62   22 122/76       Wt Readings from Last 3 Encounters:   11/15/22 210 lb (95.3 kg)   22 207 lb 6.4 oz (94.1 kg)   10/18/22 210 lb (95.3 kg)       Physical Exam  Constitutional:       Appearance: Normal appearance. Cardiovascular:      Rate and Rhythm: Normal rate and regular rhythm. Pulses: Normal pulses. Heart sounds: Normal heart sounds. No murmur heard. Pulmonary:      Effort: Pulmonary effort is normal.      Breath sounds: Normal breath sounds. Abdominal:      General: Bowel sounds are normal.   Skin:     General: Skin is warm. Neurological:      General: No focal deficit present. Mental Status: He is alert and oriented to person, place, and time. Psychiatric:      Comments: Depressive mood and anxious in office today          ASSESSMENT/PLAN:    1. Adjustment disorder with mixed anxiety and depressed mood  See above HPI. We will trial the patient on Wellbutrin at this time. Patient has been on several medications for anxiety and depression in the past.  Has been on hydroxyzine, BuSpar, nortriptyline, Effexor, Lexapro. We will consider Trintellix if Wellbutrin does not appear to be managing symptoms. Discussed with the patient he is on a starting dose of this medication and we have room to increase dosing for management of his anxiety and depression. Patient will follow up in 3 weeks for evaluation of symptoms. Contact the office in the interim as needed. - buPROPion (WELLBUTRIN XL) 150 MG extended release tablet; Take 1 tablet by mouth every morning  Dispense: 30 tablet; Refill: 3      20 min spent on pt care. This document was prepared by a combination of typing and transcription through a voice recognition software.     URSULA Gao - CNP

## 2022-12-06 ENCOUNTER — OFFICE VISIT (OUTPATIENT)
Dept: FAMILY MEDICINE CLINIC | Age: 37
End: 2022-12-06
Payer: COMMERCIAL

## 2022-12-06 VITALS
HEIGHT: 71 IN | WEIGHT: 211.6 LBS | BODY MASS INDEX: 29.62 KG/M2 | OXYGEN SATURATION: 97 % | DIASTOLIC BLOOD PRESSURE: 70 MMHG | SYSTOLIC BLOOD PRESSURE: 132 MMHG | HEART RATE: 84 BPM

## 2022-12-06 DIAGNOSIS — F43.23 ADJUSTMENT DISORDER WITH MIXED ANXIETY AND DEPRESSED MOOD: Primary | ICD-10-CM

## 2022-12-06 PROCEDURE — 1036F TOBACCO NON-USER: CPT

## 2022-12-06 PROCEDURE — 99213 OFFICE O/P EST LOW 20 MIN: CPT

## 2022-12-06 PROCEDURE — G8419 CALC BMI OUT NRM PARAM NOF/U: HCPCS

## 2022-12-06 PROCEDURE — G8484 FLU IMMUNIZE NO ADMIN: HCPCS

## 2022-12-06 PROCEDURE — G8427 DOCREV CUR MEDS BY ELIG CLIN: HCPCS

## 2022-12-06 RX ORDER — BUSPIRONE HYDROCHLORIDE 10 MG/1
10 TABLET ORAL 3 TIMES DAILY PRN
Qty: 90 TABLET | Refills: 0 | Status: SHIPPED | OUTPATIENT
Start: 2022-12-06 | End: 2023-01-05

## 2022-12-06 ASSESSMENT — ENCOUNTER SYMPTOMS
SHORTNESS OF BREATH: 0
GASTROINTESTINAL NEGATIVE: 1
CHEST TIGHTNESS: 1
WHEEZING: 0

## 2022-12-06 NOTE — PROGRESS NOTES
Chief Complaint   Patient presents with    Anxiety    Depression       HPI:  Ambrosio Qiu is a 40 y.o. (: 1985) here today   for 3-week follow-up adjustment disorder with mixed anxiety and depressive mood disorder. Was placed on Wellbutrin 150 mg extended release during her last visit on 11/15/2022. Feels energy level, mood, and ambition is better. Still have some anxiety at times that will cause some chest tightness when gets real bad. Wants to try a non addictive controlled PRN med. Patient's medications, allergies, past medical, surgical, social and family histories were reviewed and updated as appropriate. ROS:  Review of Systems   Constitutional: Negative. HENT: Negative. Respiratory:  Positive for chest tightness. Negative for shortness of breath and wheezing. Cardiovascular: Negative. Gastrointestinal: Negative. Neurological:  Positive for headaches. Psychiatric/Behavioral:  Positive for sleep disturbance. The patient is nervous/anxious.           No results found for: LABA1C, LABMICR, [address]    Past Medical History:   Diagnosis Date    GERD (gastroesophageal reflux disease)     Headache(784.0)        Family History   Problem Relation Age of Onset    Cancer Maternal Grandmother         ovarian    Cancer Mother        Social History     Socioeconomic History    Marital status:      Spouse name: Not on file    Number of children: Not on file    Years of education: Not on file    Highest education level: Not on file   Occupational History    Not on file   Tobacco Use    Smoking status: Never    Smokeless tobacco: Never   Vaping Use    Vaping Use: Never used   Substance and Sexual Activity    Alcohol use: No    Drug use: No    Sexual activity: Yes     Partners: Female   Other Topics Concern    Not on file   Social History Narrative    Not on file     Social Determinants of Health     Financial Resource Strain: Not on file   Food Insecurity: Not on file Transportation Needs: Not on file   Physical Activity: Not on file   Stress: Not on file   Social Connections: Not on file   Intimate Partner Violence: Not on file   Housing Stability: Not on file       Prior to Visit Medications    Medication Sig Taking? Authorizing Provider   busPIRone (BUSPAR) 10 MG tablet Take 1 tablet by mouth 3 times daily as needed (anxiety) Yes Blease URSULA Garber CNP   topiramate (TOPAMAX) 25 MG tablet TAKE 1 TAB AT BEDTIME FOR 7 DAYS, THEN 2 TWO TABS AT BEDTIME FOR 7 DAYS, THEN 1 TABLET IN THE MORNING & 2 TABS IN THE EVENING, THEN 2 TABS TWICE DAILY  Historical Provider, MD   buPROPion (WELLBUTRIN XL) 150 MG extended release tablet Take 1 tablet by mouth every morning  Blease URSULA Garber CNP   pantoprazole (PROTONIX) 40 MG tablet Take by mouth daily  Historical Provider, MD       No Known Allergies    OBJECTIVE:    /70   Pulse 84   Ht 5' 11\" (1.803 m)   Wt 211 lb 9.6 oz (96 kg)   SpO2 97%   BMI 29.51 kg/m²     BP Readings from Last 2 Encounters:   12/06/22 132/70   11/15/22 110/62       Wt Readings from Last 3 Encounters:   12/06/22 211 lb 9.6 oz (96 kg)   11/15/22 210 lb (95.3 kg)   11/01/22 207 lb 6.4 oz (94.1 kg)       Physical Exam  Constitutional:       Appearance: Normal appearance. HENT:      Head: Normocephalic and atraumatic. Eyes:      Pupils: Pupils are equal, round, and reactive to light. Cardiovascular:      Rate and Rhythm: Normal rate and regular rhythm. Pulses: Normal pulses. Heart sounds: Normal heart sounds. Pulmonary:      Effort: Pulmonary effort is normal.      Breath sounds: Normal breath sounds. No wheezing. Abdominal:      General: Bowel sounds are normal.   Musculoskeletal:         General: Normal range of motion. Skin:     General: Skin is warm. Neurological:      General: No focal deficit present. Mental Status: He is alert and oriented to person, place, and time.    Psychiatric:         Behavior: Behavior normal.         ASSESSMENT/PLAN:    1. Adjustment disorder with mixed anxiety and depressed mood  Patient seen off today for 3-week follow-up after starting Wellbutrin for his condition with anxiety depression. Symptoms have improved but is still experiencing chest tightness at times with higher anxiety fill days. We will trial the patient on BuSpar to use as needed in addition to his Wellbutrin 150 mg extended release dosage. We will follow-up in 3 to 4 weeks for evaluation of symptoms. I explained to the patient at his next appointment we will have the option of increasing Wellbutrin dosing. We discussed the possibility of adding trazodone or Remeron for sleep given patient's insomnia related to his condition. Patient encouraged to follow back up with office in the interim as needed for any adverse effects from current medication regimen.  - busPIRone (BUSPAR) 10 MG tablet; Take 1 tablet by mouth 3 times daily as needed (anxiety)  Dispense: 90 tablet; Refill: 0    20 min spent on pt care. This document was prepared by a combination of typing and transcription through a voice recognition software.     URSULA Leon - CNP

## 2022-12-08 ENCOUNTER — TELEPHONE (OUTPATIENT)
Dept: FAMILY MEDICINE CLINIC | Age: 37
End: 2022-12-08

## 2022-12-08 RX ORDER — BUPROPION HYDROCHLORIDE 300 MG/1
300 TABLET ORAL EVERY MORNING
Qty: 30 TABLET | Refills: 3 | Status: SHIPPED | OUTPATIENT
Start: 2022-12-08

## 2022-12-08 RX ORDER — BUPROPION HYDROCHLORIDE 300 MG/1
300 TABLET ORAL EVERY MORNING
Qty: 30 TABLET | Refills: 3 | Status: CANCELLED | OUTPATIENT
Start: 2022-12-08 | End: 2023-04-07

## 2022-12-08 NOTE — TELEPHONE ENCOUNTER
Pt called and said that the Wellbutrin helped with motivation and mood, but has not helped with chest tightness, sleep, has been on wellbutrin for 3 weeks and a day

## 2022-12-08 NOTE — TELEPHONE ENCOUNTER
Given some improvement, can inc to 300mg po daily. If fails to see further improvement, will likely try to change to trintellix.

## 2022-12-21 ENCOUNTER — OFFICE VISIT (OUTPATIENT)
Dept: FAMILY MEDICINE CLINIC | Age: 37
End: 2022-12-21
Payer: COMMERCIAL

## 2022-12-21 VITALS
HEIGHT: 71 IN | DIASTOLIC BLOOD PRESSURE: 86 MMHG | HEART RATE: 92 BPM | BODY MASS INDEX: 29.26 KG/M2 | OXYGEN SATURATION: 97 % | WEIGHT: 209 LBS | SYSTOLIC BLOOD PRESSURE: 122 MMHG

## 2022-12-21 DIAGNOSIS — Z11.3 SCREEN FOR STD (SEXUALLY TRANSMITTED DISEASE): ICD-10-CM

## 2022-12-21 DIAGNOSIS — F43.23 ADJUSTMENT DISORDER WITH MIXED ANXIETY AND DEPRESSED MOOD: Primary | ICD-10-CM

## 2022-12-21 DIAGNOSIS — R51.9 NONINTRACTABLE HEADACHE, UNSPECIFIED CHRONICITY PATTERN, UNSPECIFIED HEADACHE TYPE: ICD-10-CM

## 2022-12-21 LAB
HAV IGM SER IA-ACNC: NORMAL
HEPATITIS B CORE IGM ANTIBODY: NORMAL
HEPATITIS B SURFACE ANTIGEN INTERPRETATION: NORMAL
HEPATITIS C ANTIBODY INTERPRETATION: NORMAL

## 2022-12-21 PROCEDURE — G8419 CALC BMI OUT NRM PARAM NOF/U: HCPCS | Performed by: FAMILY MEDICINE

## 2022-12-21 PROCEDURE — 1036F TOBACCO NON-USER: CPT | Performed by: FAMILY MEDICINE

## 2022-12-21 PROCEDURE — 99214 OFFICE O/P EST MOD 30 MIN: CPT | Performed by: FAMILY MEDICINE

## 2022-12-21 PROCEDURE — G8427 DOCREV CUR MEDS BY ELIG CLIN: HCPCS | Performed by: FAMILY MEDICINE

## 2022-12-21 PROCEDURE — G8484 FLU IMMUNIZE NO ADMIN: HCPCS | Performed by: FAMILY MEDICINE

## 2022-12-21 RX ORDER — BUPROPION HYDROCHLORIDE 150 MG/1
150 TABLET ORAL EVERY MORNING
Qty: 30 TABLET | Refills: 3 | Status: SHIPPED | OUTPATIENT
Start: 2022-12-21

## 2022-12-21 RX ORDER — ESCITALOPRAM OXALATE 10 MG/1
10 TABLET ORAL DAILY
Qty: 30 TABLET | Refills: 3 | Status: SHIPPED | OUTPATIENT
Start: 2022-12-21

## 2022-12-21 ASSESSMENT — ENCOUNTER SYMPTOMS: SHORTNESS OF BREATH: 0

## 2022-12-21 NOTE — PROGRESS NOTES
Chief Complaint   Patient presents with    Anxiety       HPI:  Ambrose Jarrett is a 40 y.o. (: 1985) here today   for follow up on anxiety. HPI  Wakes up w/ band sensation to head. Had seen neuro in the past.  Marden Fabrice may be anxiety related. Headaches improved. Has some nurtec samples. Trying injections as well. Seems cyclical.  Took lexapro in the past.  ? Improvement. Has still been having issues w/ not sleeping well. On average, only 4 hr of sleep. Wellbutrin has helped motivation and mood, just not anxiety improvement. Sig amt of stress as well. Currently headaches more at top of the head. Pt concerned about screening for std. Patient's medications, allergies, past medical, surgical, social and family histories were reviewed and updated as appropriate. ROS:  Review of Systems   Constitutional:  Negative for fever. Respiratory:  Negative for shortness of breath. Neurological:  Positive for headaches. Psychiatric/Behavioral:  Positive for dysphoric mood and sleep disturbance. The patient is nervous/anxious.           No results found for: LABA1C, LABMICR, [address]    Past Medical History:   Diagnosis Date    GERD (gastroesophageal reflux disease)     Headache(784.0)        Family History   Problem Relation Age of Onset    Cancer Maternal Grandmother         ovarian    Cancer Mother        Social History     Socioeconomic History    Marital status:      Spouse name: Not on file    Number of children: Not on file    Years of education: Not on file    Highest education level: Not on file   Occupational History    Not on file   Tobacco Use    Smoking status: Never    Smokeless tobacco: Never   Vaping Use    Vaping Use: Never used   Substance and Sexual Activity    Alcohol use: No    Drug use: No    Sexual activity: Yes     Partners: Female   Other Topics Concern    Not on file   Social History Narrative    Not on file     Social Determinants of Health     Financial Resource Strain: Not on file   Food Insecurity: Not on file   Transportation Needs: Not on file   Physical Activity: Not on file   Stress: Not on file   Social Connections: Not on file   Intimate Partner Violence: Not on file   Housing Stability: Not on file       Prior to Visit Medications    Medication Sig Taking? Authorizing Provider   escitalopram (LEXAPRO) 10 MG tablet Take 1 tablet by mouth daily Yes Giovanni Baumgarten, MD   buPROPion (WELLBUTRIN XL) 150 MG extended release tablet Take 1 tablet by mouth every morning Yes Giovanni Baumgarten, MD   busPIRone (BUSPAR) 10 MG tablet Take 1 tablet by mouth 3 times daily as needed (anxiety) Yes Vipul Calle, APRN - CNP   pantoprazole (PROTONIX) 40 MG tablet Take by mouth daily Yes Historical Provider, MD       No Known Allergies    OBJECTIVE:    /86   Pulse 92   Ht 5' 11\" (1.803 m)   Wt 209 lb (94.8 kg)   SpO2 97%   BMI 29.15 kg/m²     BP Readings from Last 2 Encounters:   12/21/22 122/86   12/06/22 132/70       Wt Readings from Last 3 Encounters:   12/21/22 209 lb (94.8 kg)   12/06/22 211 lb 9.6 oz (96 kg)   11/15/22 210 lb (95.3 kg)       Physical Exam  Constitutional:       Appearance: Normal appearance. HENT:      Head: Normocephalic and atraumatic. Eyes:      Extraocular Movements: Extraocular movements intact. Cardiovascular:      Rate and Rhythm: Normal rate and regular rhythm. Pulmonary:      Effort: Pulmonary effort is normal.      Breath sounds: Normal breath sounds. Skin:     General: Skin is warm and dry. Neurological:      General: No focal deficit present. Mental Status: He is alert and oriented to person, place, and time. Psychiatric:         Mood and Affect: Mood is anxious and depressed. ASSESSMENT/PLAN:    1. Adjustment disorder with mixed anxiety and depressed mood  Has used lexapro in the past w/ some benefit. Some improvement in motivation and mood w/wellbutrin. Reduce wellbutrin dose. Add lexapro as below.   F/u in 6 weeks or so. Could still consider trintillex if not better  - escitalopram (LEXAPRO) 10 MG tablet; Take 1 tablet by mouth daily  Dispense: 30 tablet; Refill: 3  - buPROPion (WELLBUTRIN XL) 150 MG extended release tablet; Take 1 tablet by mouth every morning  Dispense: 30 tablet; Refill: 3    2. Nonintractable headache, unspecified chronicity pattern, unspecified headache type  F/u w/ specialist as sched.       3. Screen for STD (sexually transmitted disease)  Labs today.    - C.trachomatis N.gonorrhoeae DNA  - HIV Screen  - Hepatitis Panel, Acute

## 2022-12-22 LAB
C. TRACHOMATIS DNA ,URINE: NEGATIVE
HIV AG/AB: NORMAL
HIV ANTIGEN: NORMAL
HIV-1 ANTIBODY: NORMAL
HIV-2 AB: NORMAL
N. GONORRHOEAE DNA, URINE: NEGATIVE
TOTAL SYPHILLIS IGG/IGM: NORMAL

## 2023-01-18 ENCOUNTER — OFFICE VISIT (OUTPATIENT)
Dept: FAMILY MEDICINE CLINIC | Age: 38
End: 2023-01-18
Payer: COMMERCIAL

## 2023-01-18 VITALS
OXYGEN SATURATION: 96 % | BODY MASS INDEX: 29.96 KG/M2 | SYSTOLIC BLOOD PRESSURE: 118 MMHG | DIASTOLIC BLOOD PRESSURE: 82 MMHG | WEIGHT: 214 LBS | HEIGHT: 71 IN | HEART RATE: 92 BPM

## 2023-01-18 DIAGNOSIS — F43.23 ADJUSTMENT DISORDER WITH MIXED ANXIETY AND DEPRESSED MOOD: Primary | ICD-10-CM

## 2023-01-18 DIAGNOSIS — R51.9 NONINTRACTABLE HEADACHE, UNSPECIFIED CHRONICITY PATTERN, UNSPECIFIED HEADACHE TYPE: ICD-10-CM

## 2023-01-18 PROCEDURE — 99213 OFFICE O/P EST LOW 20 MIN: CPT | Performed by: FAMILY MEDICINE

## 2023-01-18 PROCEDURE — G8419 CALC BMI OUT NRM PARAM NOF/U: HCPCS | Performed by: FAMILY MEDICINE

## 2023-01-18 PROCEDURE — G8427 DOCREV CUR MEDS BY ELIG CLIN: HCPCS | Performed by: FAMILY MEDICINE

## 2023-01-18 PROCEDURE — G8484 FLU IMMUNIZE NO ADMIN: HCPCS | Performed by: FAMILY MEDICINE

## 2023-01-18 PROCEDURE — 1036F TOBACCO NON-USER: CPT | Performed by: FAMILY MEDICINE

## 2023-01-18 RX ORDER — ERENUMAB-AOOE 140 MG/ML
INJECTION, SOLUTION SUBCUTANEOUS
COMMUNITY
Start: 2022-12-05

## 2023-01-18 ASSESSMENT — ENCOUNTER SYMPTOMS: NAUSEA: 1

## 2023-01-18 NOTE — PROGRESS NOTES
Chief Complaint   Patient presents with    Check-Up     4 week check up       HPI:  Lonny Silva is a 37 y.o. (: 1985) here today   for 4 week check up.  HPI  Has had felt sluggish in am since starting lexapro and lowering wellbutrin dose.  Some nausea, ongoing over past mo.  Has been sleeping a little better and chest tightness a little better.  Some sexual SE noted w/ lexapro as well.      Aimovig shot helping w/ headaches somewhat.     Patient's medications, allergies, past medical, surgical, social and family histories were reviewed and updated as appropriate.    ROS:  Review of Systems   Constitutional:  Positive for fatigue. Negative for fever.   Gastrointestinal:  Positive for nausea.   Neurological:  Positive for headaches.   Psychiatric/Behavioral:  Positive for dysphoric mood. The patient is nervous/anxious.          No results found for: LABA1C, LABMICR, LDLCALC    Past Medical History:   Diagnosis Date    GERD (gastroesophageal reflux disease)     Headache(784.0)        Family History   Problem Relation Age of Onset    Cancer Maternal Grandmother         ovarian    Cancer Mother        Social History     Socioeconomic History    Marital status:      Spouse name: Not on file    Number of children: Not on file    Years of education: Not on file    Highest education level: Not on file   Occupational History    Not on file   Tobacco Use    Smoking status: Never    Smokeless tobacco: Never   Vaping Use    Vaping Use: Never used   Substance and Sexual Activity    Alcohol use: No    Drug use: No    Sexual activity: Yes     Partners: Female   Other Topics Concern    Not on file   Social History Narrative    Not on file     Social Determinants of Health     Financial Resource Strain: Not on file   Food Insecurity: Not on file   Transportation Needs: Not on file   Physical Activity: Not on file   Stress: Not on file   Social Connections: Not on file   Intimate Partner Violence: Not on file  Housing Stability: Not on file       Prior to Visit Medications    Medication Sig Taking? Authorizing Provider   AIMOVIG 140 MG/ML SOAJ INJECT ONE SYRINGE SUB-Q ONCE A MONTH Yes Historical Provider, MD   VORTIoxetine (TRINTELLIX) 5 MG tablet Take 2 tablets by mouth daily Yes Jarrod Finnegan MD   escitalopram (LEXAPRO) 10 MG tablet Take 1 tablet by mouth daily Yes Jarrod Finnegan MD   buPROPion (WELLBUTRIN XL) 150 MG extended release tablet Take 1 tablet by mouth every morning Yes Jarrod Finnegan MD   pantoprazole (PROTONIX) 40 MG tablet Take by mouth daily Yes Historical Provider, MD       No Known Allergies    OBJECTIVE:    /82   Pulse 92   Ht 5' 11\" (1.803 m)   Wt 214 lb (97.1 kg)   SpO2 96%   BMI 29.85 kg/m²     BP Readings from Last 2 Encounters:   01/18/23 118/82   12/21/22 122/86       Wt Readings from Last 3 Encounters:   01/18/23 214 lb (97.1 kg)   12/21/22 209 lb (94.8 kg)   12/06/22 211 lb 9.6 oz (96 kg)       Physical Exam  Constitutional:       Appearance: Normal appearance. HENT:      Head: Normocephalic and atraumatic. Eyes:      Extraocular Movements: Extraocular movements intact. Pulmonary:      Effort: Pulmonary effort is normal.   Skin:     General: Skin is warm and dry. Neurological:      General: No focal deficit present. Mental Status: He is alert and oriented to person, place, and time. Psychiatric:         Mood and Affect: Mood is anxious and depressed. ASSESSMENT/PLAN:    1. Adjustment disorder with mixed anxiety and depressed mood  Ongoing issues noted. While he has had some slight improvement in sleep pattern and chest pressure, he has had nausea and sexual side effects and increase sluggishness since reducing Wellbutrin and starting Lexapro. We discussed options of discontinuing Lexapro and adding BuSpar to help with anxiety versus changing Lexapro to Trintellix to reduce risk of side effects, versus others.   We also discussed GeneSight testing as a potential option. Given the patient has been on multiple medications in the past, I believe a trial of Trintellix to reduce risk of side effects is beneficial.  If he does not see improvement of his symptoms or does not tolerate the medication, would consider resuming prior dose of Wellbutrin and adding BuSpar. Patient to call with response to treatment  - VORTIoxetine (TRINTELLIX) 5 MG tablet; Take 2 tablets by mouth daily  Dispense: 28 tablet; Refill: 0    2. Nonintractable headache, unspecified chronicity pattern, unspecified headache type  Ongoing issues with headaches. Has used Aimovig. Had been on desipramine in the past as well. Follow-up with neurology as scheduled. If desipramine is to be added to help with his headaches, it may impact our dosing of his other medications as above    This document was prepared by a combination of typing and transcription through a voice recognition software.

## 2023-02-21 ENCOUNTER — OFFICE VISIT (OUTPATIENT)
Dept: FAMILY MEDICINE CLINIC | Age: 38
End: 2023-02-21
Payer: COMMERCIAL

## 2023-02-21 VITALS
WEIGHT: 226 LBS | OXYGEN SATURATION: 97 % | DIASTOLIC BLOOD PRESSURE: 80 MMHG | HEIGHT: 71 IN | BODY MASS INDEX: 31.64 KG/M2 | SYSTOLIC BLOOD PRESSURE: 128 MMHG | HEART RATE: 82 BPM

## 2023-02-21 DIAGNOSIS — F43.23 ADJUSTMENT DISORDER WITH MIXED ANXIETY AND DEPRESSED MOOD: Primary | ICD-10-CM

## 2023-02-21 DIAGNOSIS — F41.9 ANXIETY: ICD-10-CM

## 2023-02-21 DIAGNOSIS — G44.221 CHRONIC TENSION-TYPE HEADACHE, INTRACTABLE: ICD-10-CM

## 2023-02-21 DIAGNOSIS — R51.9 NONINTRACTABLE HEADACHE, UNSPECIFIED CHRONICITY PATTERN, UNSPECIFIED HEADACHE TYPE: ICD-10-CM

## 2023-02-21 PROCEDURE — G8427 DOCREV CUR MEDS BY ELIG CLIN: HCPCS | Performed by: FAMILY MEDICINE

## 2023-02-21 PROCEDURE — 1036F TOBACCO NON-USER: CPT | Performed by: FAMILY MEDICINE

## 2023-02-21 PROCEDURE — 99213 OFFICE O/P EST LOW 20 MIN: CPT | Performed by: FAMILY MEDICINE

## 2023-02-21 PROCEDURE — G8484 FLU IMMUNIZE NO ADMIN: HCPCS | Performed by: FAMILY MEDICINE

## 2023-02-21 PROCEDURE — G8417 CALC BMI ABV UP PARAM F/U: HCPCS | Performed by: FAMILY MEDICINE

## 2023-02-21 NOTE — PROGRESS NOTES
Chief Complaint   Patient presents with    Check-Up     4 week check up       HPI:  Ashley Nugent is a 40 y.o. (: 1985) here today   for 4 week check up. HPI  Has done samples of trintellix. Sleep pattern has improved. Chest tightness improved, but having some headaches. Has been having sexual SE w/ adding trintellix. Had change in shot for migraines. Ongoing issues w/ headaches. Will start new shot in a couple of weeks. Headaches and anxiety seem worse overall. Mood and motivation seemed at little better w/ wellbutrin. Patient's medications, allergies, past medical, surgical, social and family histories were reviewed and updated as appropriate. ROS:  Review of Systems   Constitutional:  Negative for fever. Neurological:  Positive for headaches. Psychiatric/Behavioral:  Positive for dysphoric mood and sleep disturbance.           No results found for: LABA1C, LABMICR, [address]    Past Medical History:   Diagnosis Date    GERD (gastroesophageal reflux disease)     Headache(784.0)        Family History   Problem Relation Age of Onset    Cancer Maternal Grandmother         ovarian    Cancer Mother        Social History     Socioeconomic History    Marital status:      Spouse name: Not on file    Number of children: Not on file    Years of education: Not on file    Highest education level: Not on file   Occupational History    Not on file   Tobacco Use    Smoking status: Never    Smokeless tobacco: Never   Vaping Use    Vaping Use: Never used   Substance and Sexual Activity    Alcohol use: No    Drug use: No    Sexual activity: Yes     Partners: Female   Other Topics Concern    Not on file   Social History Narrative    Not on file     Social Determinants of Health     Financial Resource Strain: Not on file   Food Insecurity: Not on file   Transportation Needs: Not on file   Physical Activity: Not on file   Stress: Not on file   Social Connections: Not on file   Intimate Partner Violence: Not on file   Housing Stability: Not on file       Prior to Visit Medications    Medication Sig Taking? Authorizing Provider   AIMOVIG 140 MG/ML SOAJ INJECT ONE SYRINGE SUB-Q ONCE A MONTH Yes Historical Provider, MD   VORTIoxetine (TRINTELLIX) 5 MG tablet Take 2 tablets by mouth daily Yes Iris Kincaid MD   buPROPion (WELLBUTRIN XL) 150 MG extended release tablet Take 1 tablet by mouth every morning Yes Iris Kincaid MD   pantoprazole (PROTONIX) 40 MG tablet Take by mouth daily Yes Historical Provider, MD       No Known Allergies    OBJECTIVE:    /80   Pulse 82   Ht 5' 11\" (1.803 m)   Wt 226 lb (102.5 kg)   SpO2 97%   BMI 31.52 kg/m²     BP Readings from Last 2 Encounters:   02/21/23 128/80   01/18/23 118/82       Wt Readings from Last 3 Encounters:   02/21/23 226 lb (102.5 kg)   01/18/23 214 lb (97.1 kg)   12/21/22 209 lb (94.8 kg)       Physical Exam  Constitutional:       Appearance: Normal appearance. HENT:      Head: Normocephalic and atraumatic. Eyes:      Extraocular Movements: Extraocular movements intact. Pulmonary:      Effort: Pulmonary effort is normal.   Skin:     General: Skin is warm and dry. Neurological:      General: No focal deficit present. Mental Status: He is alert and oriented to person, place, and time. Psychiatric:         Mood and Affect: Mood is anxious and depressed. ASSESSMENT/PLAN:    1. Adjustment disorder with mixed anxiety and depressed mood  Some improvement with sleep with the Trintellix, but he did have some sexual side effects and it may have contributed to increased headache. Ongoing issues with anxiety as well. Patient would like to proceed with GeneSight testing. We discussed the option of increasing his Wellbutrin, discontinuing Trintellix, and adding back BuSpar. We also discussed the option of continuing his current medication regimen until the GeneSight testing is available. Patient wishes to go without option.   We will continue medication for now. Given additional samples of Trintellix. Continue Wellbutrin. We could consider a trial of Effexor or Pristiq as an option as well, but will await [employer] testing and decide how to proceed    2. Nonintractable headache, unspecified chronicity pattern, unspecified headache type  Patient with ongoing issues. He does follow-up with neurology. He has a new shot that he plans on trying. We briefly discussed Tete Morris as a possibility as well. He does mention that he had some benefit with desipramine in the past.    3. Chronic tension-type headache, intractable  See above    4. Anxiety  See above    This document was prepared by a combination of typing and transcription through a voice recognition software.

## 2023-02-22 ENCOUNTER — NURSE ONLY (OUTPATIENT)
Dept: FAMILY MEDICINE CLINIC | Age: 38
End: 2023-02-22

## 2023-02-22 DIAGNOSIS — F43.23 ADJUSTMENT DISORDER WITH MIXED ANXIETY AND DEPRESSED MOOD: Primary | ICD-10-CM

## 2023-02-28 DIAGNOSIS — F43.23 ADJUSTMENT DISORDER WITH MIXED ANXIETY AND DEPRESSED MOOD: Primary | ICD-10-CM

## 2023-02-28 RX ORDER — DESVENLAFAXINE 50 MG/1
50 TABLET, EXTENDED RELEASE ORAL DAILY
Qty: 30 TABLET | Refills: 3 | Status: SHIPPED | OUTPATIENT
Start: 2023-02-28

## 2023-06-05 ENCOUNTER — OFFICE VISIT (OUTPATIENT)
Dept: FAMILY MEDICINE CLINIC | Age: 38
End: 2023-06-05
Payer: COMMERCIAL

## 2023-06-05 VITALS
SYSTOLIC BLOOD PRESSURE: 122 MMHG | WEIGHT: 235 LBS | OXYGEN SATURATION: 98 % | DIASTOLIC BLOOD PRESSURE: 76 MMHG | HEIGHT: 71 IN | HEART RATE: 94 BPM | BODY MASS INDEX: 32.9 KG/M2

## 2023-06-05 DIAGNOSIS — F43.23 ADJUSTMENT DISORDER WITH MIXED ANXIETY AND DEPRESSED MOOD: Primary | ICD-10-CM

## 2023-06-05 DIAGNOSIS — G44.89 OTHER HEADACHE SYNDROME: ICD-10-CM

## 2023-06-05 PROCEDURE — G8417 CALC BMI ABV UP PARAM F/U: HCPCS | Performed by: FAMILY MEDICINE

## 2023-06-05 PROCEDURE — G8427 DOCREV CUR MEDS BY ELIG CLIN: HCPCS | Performed by: FAMILY MEDICINE

## 2023-06-05 PROCEDURE — 99214 OFFICE O/P EST MOD 30 MIN: CPT | Performed by: FAMILY MEDICINE

## 2023-06-05 PROCEDURE — 1036F TOBACCO NON-USER: CPT | Performed by: FAMILY MEDICINE

## 2023-06-05 RX ORDER — DESVENLAFAXINE 25 MG/1
25 TABLET, EXTENDED RELEASE ORAL
Qty: 30 TABLET | Refills: 3 | Status: SHIPPED | OUTPATIENT
Start: 2023-06-05

## 2023-06-05 ASSESSMENT — ENCOUNTER SYMPTOMS: SHORTNESS OF BREATH: 0

## 2023-06-05 NOTE — PROGRESS NOTES
Social History Narrative    Not on file     Social Determinants of Health     Financial Resource Strain: Low Risk     Difficulty of Paying Living Expenses: Not hard at all   Food Insecurity: No Food Insecurity    Worried About [address] in the Last Year: Never true    [address] in the Last Year: Never true   Transportation Needs: Unknown    Lack of Transportation (Medical): Not on file    Lack of Transportation (Non-Medical): No   Physical Activity: Not on file   Stress: Not on file   Social Connections: Not on file   Intimate Partner Violence: Not on file   Housing Stability: Unknown    Unable to Pay for Housing in the Last Year: Not on file    Number of Places Lived in the Last Year: Not on file    Unstable Housing in the Last Year: No       Prior to Visit Medications    Medication Sig Taking? Authorizing Provider   desvenlafaxine succinate (PRISTIQ) 25 MG TB24 extended release tablet Take 1 tablet by mouth three times a week Alternate w/ 50mg tabs Yes Pramod Louis MD   desvenlafaxine succinate (PRISTIQ) 50 MG TB24 extended release tablet Take 1 tablet by mouth daily Yes Pramod Louis MD   AIMOVIG 140 MG/ML SOAJ INJECT ONE SYRINGE SUB-Q ONCE A MONTH Yes Historical Provider, MD   pantoprazole (PROTONIX) 40 MG tablet Take by mouth daily Yes Historical Provider, MD       No Known Allergies    OBJECTIVE:    /76   Pulse 94   Ht 5' 11\" (1.803 m)   Wt 235 lb (106.6 kg)   SpO2 98%   BMI 32.78 kg/m²     BP Readings from Last 2 Encounters:   06/05/23 122/76   04/10/23 120/70       Wt Readings from Last 3 Encounters:   06/05/23 235 lb (106.6 kg)   04/10/23 232 lb 6.4 oz (105.4 kg)   02/21/23 226 lb (102.5 kg)       Physical Exam  Constitutional:       Appearance: Normal appearance. HENT:      Head: Normocephalic and atraumatic. Eyes:      Extraocular Movements: Extraocular movements intact. Cardiovascular:      Rate and Rhythm: Normal rate and regular rhythm.    Pulmonary:      Effort:

## 2023-06-07 ENCOUNTER — TELEPHONE (OUTPATIENT)
Dept: PRIMARY CARE CLINIC | Age: 38
End: 2023-06-07

## 2023-06-07 NOTE — TELEPHONE ENCOUNTER
Submitted PA for Desvenlafaxine Succinate  Via CMM Key: [id number] STATUS: PENDING. Follow up done daily; if no response in three days we will refax for status check. If another three days goes by with no response we will call the insurance for status.

## 2023-06-22 DIAGNOSIS — F43.23 ADJUSTMENT DISORDER WITH MIXED ANXIETY AND DEPRESSED MOOD: ICD-10-CM

## 2023-06-22 RX ORDER — DESVENLAFAXINE SUCCINATE 50 MG/1
50 TABLET, EXTENDED RELEASE ORAL DAILY
Qty: 30 TABLET | Refills: 3 | Status: SHIPPED | OUTPATIENT
Start: 2023-06-22

## 2023-06-22 NOTE — TELEPHONE ENCOUNTER
Please confirm dose of this medication. We had prescribed a lower dose at his last office visit, which he was alternating with this dose.   Please check with patient to see what he actually needs at this time

## 2023-08-30 DIAGNOSIS — F41.9 ANXIETY: ICD-10-CM

## 2023-08-30 RX ORDER — BUSPIRONE HYDROCHLORIDE 5 MG/1
5 TABLET ORAL 2 TIMES DAILY
Qty: 60 TABLET | Refills: 1 | Status: SHIPPED | OUTPATIENT
Start: 2023-08-30 | End: 2023-09-29

## 2023-10-10 ENCOUNTER — TELEPHONE (OUTPATIENT)
Dept: FAMILY MEDICINE CLINIC | Age: 38
End: 2023-10-10

## 2023-10-10 NOTE — TELEPHONE ENCOUNTER
It does not appear that he is currently taking buspar as of last visit. Did he resume it? How much is he currently taking?

## 2023-10-10 NOTE — TELEPHONE ENCOUNTER
Pt called and wants to know if you will increase his buspirone.  He is having a little more trouble with his anxiety

## 2023-10-11 RX ORDER — BUSPIRONE HYDROCHLORIDE 10 MG/1
10 TABLET ORAL 2 TIMES DAILY
Qty: 180 TABLET | Refills: 3 | Status: SHIPPED | OUTPATIENT
Start: 2023-10-11

## 2023-10-12 ENCOUNTER — OFFICE VISIT (OUTPATIENT)
Dept: FAMILY MEDICINE CLINIC | Age: 38
End: 2023-10-12

## 2023-10-12 VITALS
DIASTOLIC BLOOD PRESSURE: 72 MMHG | OXYGEN SATURATION: 96 % | WEIGHT: 235.4 LBS | HEART RATE: 82 BPM | SYSTOLIC BLOOD PRESSURE: 118 MMHG | HEIGHT: 71 IN | BODY MASS INDEX: 32.96 KG/M2

## 2023-10-12 DIAGNOSIS — Z83.2 FAMILY HISTORY OF FACTOR V LEIDEN MUTATION: ICD-10-CM

## 2023-10-12 DIAGNOSIS — E29.1 HYPOGONADISM MALE: ICD-10-CM

## 2023-10-12 DIAGNOSIS — G44.89 OTHER HEADACHE SYNDROME: ICD-10-CM

## 2023-10-12 DIAGNOSIS — F43.23 ADJUSTMENT DISORDER WITH MIXED ANXIETY AND DEPRESSED MOOD: Primary | ICD-10-CM

## 2023-10-12 DIAGNOSIS — R53.83 OTHER FATIGUE: ICD-10-CM

## 2023-10-12 ASSESSMENT — ENCOUNTER SYMPTOMS: SHORTNESS OF BREATH: 0

## 2023-10-12 NOTE — PROGRESS NOTES
Chief Complaint   Patient presents with    Follow-up     Routine and medication follow-up       HPI:  Ketty Covarrubias is a 45 y.o. (: 1985) here today for medication follow-up. HPI  Has been having more tension headaches and some chest tightness. Bp high and neurology office recently. Neuro trying to get him in for botox shots. Plans on qulipta daily and ubrelvy prn. Hx of tension headaches as well. Did not see much benefit w/ nurtec in the past. New prn med given as well. ? Diclofenac. Just recently tried inc buspar dose. Some fatigue w/ pristiq and buspar. Plans on sleep study as well. Has been alternating 50mg and 25mg of pristiq. Patient's medications, allergies, past medical, surgical, social and family histories were reviewed and updated as appropriate. ROS:  Review of Systems   Constitutional:  Positive for fatigue. Negative for fever. Respiratory:  Negative for shortness of breath. Cardiovascular:  Positive for chest pain.            No results found for: \"LABA1C\", \"LDLCALC\", \"LDLDIRECT\"    Past Medical History:   Diagnosis Date    GERD (gastroesophageal reflux disease)     Headache(784.0)        Family History   Problem Relation Age of Onset    Cancer Maternal Grandmother         ovarian    Cancer Mother        Social History     Socioeconomic History    Marital status:      Spouse name: Not on file    Number of children: Not on file    Years of education: Not on file    Highest education level: Not on file   Occupational History    Not on file   Tobacco Use    Smoking status: Never    Smokeless tobacco: Never   Vaping Use    Vaping Use: Never used   Substance and Sexual Activity    Alcohol use: No    Drug use: No    Sexual activity: Yes     Partners: Female   Other Topics Concern    Not on file   Social History Narrative    Not on file     Social Determinants of Health     Financial Resource Strain: Low Risk  (4/10/2023)    Overall Financial Resource Strain

## 2023-10-13 LAB
REFERENCE LAB: NORMAL
T4 FREE: 0.81 NG/DL (ref 0.78–2.19)
TESTOSTERONE TOTAL: 204 NG/DL (ref 132–813)
TSH SERPL DL<=0.05 MIU/L-ACNC: 1.66 MIU/L (ref 0.47–4.68)
VITAMIN B-12: 823 PG/ML (ref 239–931)

## 2023-10-19 ENCOUNTER — OFFICE VISIT (OUTPATIENT)
Dept: FAMILY MEDICINE CLINIC | Age: 38
End: 2023-10-19
Payer: COMMERCIAL

## 2023-10-19 VITALS
OXYGEN SATURATION: 98 % | DIASTOLIC BLOOD PRESSURE: 82 MMHG | WEIGHT: 233.8 LBS | BODY MASS INDEX: 32.61 KG/M2 | SYSTOLIC BLOOD PRESSURE: 118 MMHG | HEART RATE: 71 BPM

## 2023-10-19 DIAGNOSIS — R21 RASH OF UNKNOWN ETIOLOGY: Primary | ICD-10-CM

## 2023-10-19 LAB
COMMENT: NORMAL
FACTOR V LEIDEN MUTATION: NORMAL
Lab: NORMAL
Lab: NORMAL

## 2023-10-19 PROCEDURE — 99214 OFFICE O/P EST MOD 30 MIN: CPT

## 2023-10-19 PROCEDURE — G8417 CALC BMI ABV UP PARAM F/U: HCPCS

## 2023-10-19 PROCEDURE — G8484 FLU IMMUNIZE NO ADMIN: HCPCS

## 2023-10-19 PROCEDURE — G8427 DOCREV CUR MEDS BY ELIG CLIN: HCPCS

## 2023-10-19 PROCEDURE — 1036F TOBACCO NON-USER: CPT

## 2023-10-19 RX ORDER — METHYLPREDNISOLONE 4 MG/1
TABLET ORAL
Qty: 1 KIT | Refills: 0 | Status: SHIPPED | OUTPATIENT
Start: 2023-10-19 | End: 2023-10-25

## 2023-10-19 RX ORDER — CLOTRIMAZOLE AND BETAMETHASONE DIPROPIONATE 10; .64 MG/G; MG/G
CREAM TOPICAL
Status: CANCELLED | OUTPATIENT
Start: 2023-10-19

## 2023-10-19 RX ORDER — TRIAMCINOLONE ACETONIDE 1 MG/G
OINTMENT TOPICAL 2 TIMES DAILY
Qty: 15 G | Refills: 0 | Status: SHIPPED | OUTPATIENT
Start: 2023-10-19 | End: 2023-10-26

## 2023-10-19 ASSESSMENT — ENCOUNTER SYMPTOMS
COLOR CHANGE: 1
RESPIRATORY NEGATIVE: 1
GASTROINTESTINAL NEGATIVE: 1

## 2023-10-20 DIAGNOSIS — R79.89 LOW TESTOSTERONE IN MALE: ICD-10-CM

## 2023-10-20 DIAGNOSIS — E29.1 HYPOGONADISM MALE: Primary | ICD-10-CM

## 2023-10-20 LAB — TESTOSTERONE, FREE DIRECT: 4.2 PG/ML (ref 8.7–25.1)

## 2023-11-09 NOTE — PROGRESS NOTES
Chief Complaint   Patient presents with    Mood Swings       HPI:  Yareli Nathan is a 40 y.o. (: 1985) here today   for issues with his mood. HPI  Headaches resumed in august.  Seen back by neuro. She recommended inc desipramine to 2 per day. Did not timothy. Returned to 1 and added muscle relaxer. Helped headache, but felt \"weird in his head\" and more anxious. Did not feel \"right. \"  Back to neuro. Cut desipramine in half and added topamax. Muscle relaxer prn. Has had sig issues w/ sleep due to inc stressors. Has had some anxiety on a few occas as well. Now, headaches doing better, but less motivation. Dec appetite. Just \"sits. \"  Last headache Friday, felt ok Saturday, felt \"off\" on . Just does not feel normal.  Seeing neuro in [place]. Patient's medications, allergies, past medical, surgical, social and family histories were reviewed and updated as appropriate. ROS:  Review of Systems   Constitutional:  Negative for fever. Neurological:  Positive for headaches. Psychiatric/Behavioral:  Positive for dysphoric mood and sleep disturbance. The patient is nervous/anxious.           No results found for: LABA1C, [facility], [address]    Past Medical History:   Diagnosis Date    GERD (gastroesophageal reflux disease)     Headache(784.0)        Family History   Problem Relation Age of Onset    Cancer Maternal Grandmother         ovarian    Cancer Mother        Social History     Socioeconomic History    Marital status:      Spouse name: Not on file    Number of children: Not on file    Years of education: Not on file    Highest education level: Not on file   Occupational History    Not on file   Tobacco Use    Smoking status: Never    Smokeless tobacco: Never   Vaping Use    Vaping Use: Never used   Substance and Sexual Activity    Alcohol use: No    Drug use: No    Sexual activity: Yes     Partners: Female   Other Topics Concern    Not on file   Social History Narrative    Not Tono Duran presents today for   Chief Complaint   Patient presents with    3 Month Follow-Up       1. \"Have you been to the ER, urgent care clinic since your last visit? Hospitalized since your last visit? \" no    2. \"Have you seen or consulted any other health care providers outside of the [address] [place] [address] since your last visit? \" no     3. For patients aged 43-73: Has the patient had a colonoscopy / FIT/ Cologuard? Yes - no Care Gap present      If the patient is female:    4. For patients aged 43-66: Has the patient had a mammogram within the past 2 years? NA - based on age or sex      11. For patients aged 21-65: Has the patient had a pap smear?  NA - based on age or sex on file     Social Determinants of Health     Financial Resource Strain: Not on file   Food Insecurity: Not on file   Transportation Needs: Not on file   Physical Activity: Not on file   Stress: Not on file   Social Connections: Not on file   Intimate Partner Violence: Not on file   Housing Stability: Not on file       Prior to Visit Medications    Medication Sig Taking? Authorizing Provider   pantoprazole (PROTONIX) 40 MG tablet Take by mouth daily Yes Historical Provider, MD   desipramine (NORPRAMIN) 25 MG tablet Take 12.5 mg by mouth daily Yes Historical Provider, MD   hydrOXYzine HCl (ATARAX) 25 MG tablet Take 1 tablet by mouth 2 times daily as needed for Anxiety  Patient not taking: Reported on 11/1/2022  Latesha Kimball APRN - CNP       No Known Allergies    OBJECTIVE:    /76   Pulse (!) 102   Ht 5' 11\" (1.803 m)   Wt 207 lb 6.4 oz (94.1 kg)   SpO2 97%   BMI 28.93 kg/m²     BP Readings from Last 2 Encounters:   11/01/22 122/76   10/18/22 124/82       Wt Readings from Last 3 Encounters:   11/01/22 207 lb 6.4 oz (94.1 kg)   10/18/22 210 lb (95.3 kg)   08/24/21 209 lb (94.8 kg)       Physical Exam  Constitutional:       Appearance: Normal appearance. HENT:      Head: Normocephalic and atraumatic. Eyes:      Extraocular Movements: Extraocular movements intact. Pulmonary:      Effort: Pulmonary effort is normal.   Neurological:      General: No focal deficit present. Mental Status: He is alert and oriented to person, place, and time. Psychiatric:         Mood and Affect: Mood is depressed. Behavior: Behavior normal.         ASSESSMENT/PLAN:    1. Anxiety  Patient has not tried hydroxyzine yet. See below regarding medications he has been on in the past.  See also below regarding headaches. Will likely need to adjust medications. 2. Adjustment disorder with mixed anxiety and depressed mood  Chart was reviewed.   Patient has been on Effexor in the past.  He is also been on Celexa, Lexapro, and BuSpar in the past.  Had significant side effects with Lexapro. Did not tolerate BuSpar or see much benefit. Significant stressors noted at home. Lack of motivation, anxiety issues, others. Patient does have hydroxyzine. For the short-term. Can try cutting hydroxyzine in half and using on an as-needed basis. I did warn about it causing some drowsiness potentially. We discussed other medication options, but will wait to see how he does with his medications for his headache. We discussed using Trintellix to decrease risk of side effects. We also discussed potentially using Wellbutrin to help with motivation and mood, but this would likely not help anxiety much. 3. Chronic tension-type headache, intractable  Patient has been tapering off of his desipramine. He plans to begin Topamax. He has been following up with neurology. Plans to follow-up there. Has Nurtec at home that he may try as well. Would hold off on adding medications for the mood until his medications for headache have been adjusted and he is tolerating well. This document was prepared by a combination of typing and transcription through a voice recognition software.

## 2023-11-17 ENCOUNTER — OFFICE VISIT (OUTPATIENT)
Dept: FAMILY MEDICINE CLINIC | Age: 38
End: 2023-11-17
Payer: COMMERCIAL

## 2023-11-17 VITALS
OXYGEN SATURATION: 97 % | BODY MASS INDEX: 34 KG/M2 | HEART RATE: 75 BPM | SYSTOLIC BLOOD PRESSURE: 136 MMHG | DIASTOLIC BLOOD PRESSURE: 76 MMHG | WEIGHT: 243.8 LBS

## 2023-11-17 DIAGNOSIS — B80 PINWORMS: ICD-10-CM

## 2023-11-17 DIAGNOSIS — R21 RASH OF UNKNOWN ETIOLOGY: Primary | ICD-10-CM

## 2023-11-17 PROCEDURE — G8417 CALC BMI ABV UP PARAM F/U: HCPCS

## 2023-11-17 PROCEDURE — 99214 OFFICE O/P EST MOD 30 MIN: CPT

## 2023-11-17 PROCEDURE — G8484 FLU IMMUNIZE NO ADMIN: HCPCS

## 2023-11-17 PROCEDURE — G8427 DOCREV CUR MEDS BY ELIG CLIN: HCPCS

## 2023-11-17 PROCEDURE — 36415 COLL VENOUS BLD VENIPUNCTURE: CPT

## 2023-11-17 PROCEDURE — 1036F TOBACCO NON-USER: CPT

## 2023-11-17 RX ORDER — IBUPROFEN 800 MG/1
800 TABLET ORAL EVERY 6 HOURS PRN
COMMUNITY

## 2023-11-17 RX ORDER — PREDNISONE 20 MG/1
20 TABLET ORAL 2 TIMES DAILY
Qty: 10 TABLET | Refills: 0 | Status: SHIPPED | OUTPATIENT
Start: 2023-11-17 | End: 2023-11-22

## 2023-11-17 RX ORDER — ALBENDAZOLE 200 MG/1
TABLET, FILM COATED ORAL
Qty: 4 TABLET | Refills: 0 | Status: SHIPPED | OUTPATIENT
Start: 2023-11-17

## 2023-11-17 ASSESSMENT — ENCOUNTER SYMPTOMS
GASTROINTESTINAL NEGATIVE: 1
RESPIRATORY NEGATIVE: 1

## 2023-11-17 NOTE — PROGRESS NOTES
al  Chief Complaint   Patient presents with    Rash       HPI:  Kacie Rhoades is a 45 y.o. (: 1985) here today   for follow-up rash. Last seen on 10/19/2023. During last office visit patient had a large rash on his neck and chest.  See prior picture in prior office note. Patient was treated with Medrol Dosepak and Kenalog cream.  It was suspected believe patient potentially made contact with a substance during his overnight trip in [place] prior to rash starting. Patient reports symptoms worsening at this time and come and go. Does not take antihistamine on a daily basis. Rash does not particularly itch significantly. Does not feel overly anxious. Both daughters at home positive for pinworms. Patient contacted Dr. Osbaldo Thomas regarding that situation and it was recommended the patient be treated empirically for pinworms. Patient's medications, allergies, past medical, surgical, social and family histories were reviewed and updated as appropriate. ROS:  Review of Systems   Constitutional: Negative. HENT: Negative. Respiratory: Negative. Cardiovascular: Negative. Gastrointestinal: Negative. Musculoskeletal: Negative. Skin:  Positive for rash. Neurological: Negative. Psychiatric/Behavioral: Negative.              No results found for: \"LABA1C\", \"LDLCALC\", \"LDLDIRECT\"    Past Medical History:   Diagnosis Date    GERD (gastroesophageal reflux disease)     Headache(784.0)        Family History   Problem Relation Age of Onset    Cancer Maternal Grandmother         ovarian    Cancer Mother        Social History     Socioeconomic History    Marital status:      Spouse name: Not on file    Number of children: Not on file    Years of education: Not on file    Highest education level: Not on file   Occupational History    Not on file   Tobacco Use    Smoking status: Never    Smokeless tobacco: Never   Vaping Use    Vaping Use: Never used   Substance and Sexual

## 2023-12-19 DIAGNOSIS — E29.1 HYPOGONADISM MALE: ICD-10-CM

## 2023-12-19 PROBLEM — G47.33 OSA (OBSTRUCTIVE SLEEP APNEA): Status: ACTIVE | Noted: 2023-12-19

## 2023-12-21 LAB
SHBG SERPL-SCNC: 20 NMOL/L (ref 17–56)
TESTOST SERPL-MCNC: 290 NG/DL (ref 220–1000)

## 2023-12-26 DIAGNOSIS — E29.1 HYPOGONADISM MALE: Primary | ICD-10-CM

## 2024-01-10 DIAGNOSIS — F43.23 ADJUSTMENT DISORDER WITH MIXED ANXIETY AND DEPRESSED MOOD: ICD-10-CM

## 2024-01-10 RX ORDER — DESVENLAFAXINE SUCCINATE 50 MG/1
50 TABLET, EXTENDED RELEASE ORAL DAILY
Qty: 15 TABLET | Refills: 3 | Status: SHIPPED | OUTPATIENT
Start: 2024-01-10

## 2024-03-20 ENCOUNTER — OFFICE VISIT (OUTPATIENT)
Dept: ENDOCRINOLOGY | Age: 39
End: 2024-03-20

## 2024-03-20 VITALS
HEART RATE: 75 BPM | HEIGHT: 71 IN | DIASTOLIC BLOOD PRESSURE: 83 MMHG | WEIGHT: 244 LBS | BODY MASS INDEX: 34.16 KG/M2 | SYSTOLIC BLOOD PRESSURE: 141 MMHG

## 2024-03-20 DIAGNOSIS — G47.33 OSA (OBSTRUCTIVE SLEEP APNEA): ICD-10-CM

## 2024-03-20 DIAGNOSIS — E29.1 HYPOGONADISM MALE: Primary | ICD-10-CM

## 2024-03-20 NOTE — PROGRESS NOTES
[facility]      Chief Complaint:     Chief Complaint   Patient presents with    Other    Follow-up     Low T evaluation         Subjective:   Lonny Silva is a pleasant 38 y.o. male who is here for follow up for low testosterone.  Patient has history of hypertension, migraine headaches, SIM, anxiety and has a BMI of 34.  He is accompanied by his wife.    Patient was previously evaluated with Dr. Cyr would last visit in 2016. Per history, he was found to have low testosterone around 2012.  He was previously started on testosterone injections 0.75 cc every week by Dr. Izaguirre.  Per note of Dr. Cyr, patient prior to T therapy was on bodybuilding medication that he took for a short period of time.     He subsequently was advised to stop testosterone therapy as he was attempting to have kids.  His wife was subsequently able to get pregnant after him stopping testosterone.  He has 2 daughters, ages 8 and 5.  He has no plans for further kids.    More recently, patient had been noticing very low energy and decreased libido.  He reports decrease in morning erections and inability to concentrate.  He also reports sleep disturbances.  He also reports snoring and has plans to complete a sleep study in the next few months.  He reports that his muscles are not as strong as previously as he noted that while working out at the gym.  He though had not been working out as frequently.  He denies any head traumas but reports frequent headaches.  He previously had daily persistent headaches and had a breakthrough few months ago.  Subsequently his traditional medication was not working therefore he switched to Botox injections.  No vision changes.  He had worsening of depression and anxiety and was started on Pristiq.  Subsequently he did gain more than 30 pounds. Never been diagnosed with diabetes but had been noted to have higher blood pressure.    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

## 2024-03-21 ENCOUNTER — TELEPHONE (OUTPATIENT)
Dept: ENDOCRINOLOGY | Age: 39
End: 2024-03-21

## 2024-03-21 NOTE — TELEPHONE ENCOUNTER
Reviewed, total testosterone in low normal range.  Pending [facility].  Will calculate free testosterone once available.

## 2024-04-24 DIAGNOSIS — F43.23 ADJUSTMENT DISORDER WITH MIXED ANXIETY AND DEPRESSED MOOD: ICD-10-CM

## 2024-04-24 RX ORDER — DESVENLAFAXINE 25 MG/1
TABLET, EXTENDED RELEASE ORAL
Qty: 12 TABLET | Refills: 3 | Status: SHIPPED | OUTPATIENT
Start: 2024-04-24

## 2024-04-24 RX ORDER — DESVENLAFAXINE SUCCINATE 50 MG/1
TABLET, EXTENDED RELEASE ORAL
Qty: 15 TABLET | Refills: 3 | Status: SHIPPED | OUTPATIENT
Start: 2024-04-24

## 2024-08-06 ENCOUNTER — OFFICE VISIT (OUTPATIENT)
Dept: FAMILY MEDICINE CLINIC | Age: 39
End: 2024-08-06
Payer: COMMERCIAL

## 2024-08-06 VITALS
HEART RATE: 84 BPM | OXYGEN SATURATION: 98 % | HEIGHT: 71 IN | SYSTOLIC BLOOD PRESSURE: 126 MMHG | WEIGHT: 237 LBS | BODY MASS INDEX: 33.18 KG/M2 | DIASTOLIC BLOOD PRESSURE: 62 MMHG

## 2024-08-06 DIAGNOSIS — T78.40XS ALLERGIC REACTION, SEQUELA: Primary | ICD-10-CM

## 2024-08-06 DIAGNOSIS — F43.23 ADJUSTMENT DISORDER WITH MIXED ANXIETY AND DEPRESSED MOOD: ICD-10-CM

## 2024-08-06 PROCEDURE — G8427 DOCREV CUR MEDS BY ELIG CLIN: HCPCS | Performed by: FAMILY MEDICINE

## 2024-08-06 PROCEDURE — G8417 CALC BMI ABV UP PARAM F/U: HCPCS | Performed by: FAMILY MEDICINE

## 2024-08-06 PROCEDURE — 99213 OFFICE O/P EST LOW 20 MIN: CPT | Performed by: FAMILY MEDICINE

## 2024-08-06 PROCEDURE — 1036F TOBACCO NON-USER: CPT | Performed by: FAMILY MEDICINE

## 2024-08-06 RX ORDER — FAMOTIDINE 40 MG/1
40 TABLET, FILM COATED ORAL 2 TIMES DAILY
Qty: 60 TABLET | Refills: 3 | Status: SHIPPED | OUTPATIENT
Start: 2024-08-06

## 2024-08-06 RX ORDER — DESVENLAFAXINE 25 MG/1
25 TABLET, EXTENDED RELEASE ORAL DAILY
Qty: 90 TABLET | Refills: 3 | Status: SHIPPED | OUTPATIENT
Start: 2024-08-06

## 2024-08-06 RX ORDER — HYDROXYZINE HYDROCHLORIDE 25 MG/1
25 TABLET, FILM COATED ORAL EVERY 8 HOURS PRN
Qty: 30 TABLET | Refills: 0 | Status: SHIPPED | OUTPATIENT
Start: 2024-08-06 | End: 2024-08-16

## 2024-08-06 ASSESSMENT — PATIENT HEALTH QUESTIONNAIRE - PHQ9
1. LITTLE INTEREST OR PLEASURE IN DOING THINGS: NOT AT ALL
2. FEELING DOWN, DEPRESSED OR HOPELESS: NOT AT ALL
SUM OF ALL RESPONSES TO PHQ9 QUESTIONS 1 & 2: 0
SUM OF ALL RESPONSES TO PHQ QUESTIONS 1-9: 0

## 2024-08-06 ASSESSMENT — ENCOUNTER SYMPTOMS: SHORTNESS OF BREATH: 0

## 2024-08-06 NOTE — PROGRESS NOTES
Chief Complaint   Patient presents with    Rash       HPI:  Lonny Silva is a 39 y.o. (: 1985) here today   for rash.  HPI  Tried new energy drink.  Some laundry detergent changes as well.  Seen at urgent care on .  Given steroid injection.  Medrol dosepack.  Has spread more since given injection and medrol dosepack.  Has been outside, but no other new exposures.  No other known exposures to rash.  Had some issues w/ prior \"lemonade packets.\"  Thinks new energy drink caused.      Would like to lower pristiq dose.  Had been alternating 50mg and 25mg.  Prefers 25mg only.     Patient's medications, allergies, past medical, surgical, social and family histories were reviewed and updated as appropriate.    ROS:  Review of Systems   Constitutional:  Negative for fever.   Respiratory:  Negative for shortness of breath.    Skin:  Positive for rash.           No results found for: \"LABA1C\", \"LDLDIRECT\"    Past Medical History:   Diagnosis Date    GERD (gastroesophageal reflux disease)     Headache(784.0)     SIM (obstructive sleep apnea) 2023       Family History   Problem Relation Age of Onset    Cancer Maternal Grandmother         ovarian    Cancer Mother        Social History     Socioeconomic History    Marital status:      Spouse name: Not on file    Number of children: Not on file    Years of education: Not on file    Highest education level: Not on file   Occupational History    Not on file   Tobacco Use    Smoking status: Never    Smokeless tobacco: Never   Vaping Use    Vaping Use: Never used   Substance and Sexual Activity    Alcohol use: No    Drug use: No    Sexual activity: Yes     Partners: Female   Other Topics Concern    Not on file   Social History Narrative    Not on file     Social Determinants of Health     Financial Resource Strain: Low Risk  (4/10/2023)    Overall Financial Resource Strain (CARDIA)     Difficulty of Paying Living Expenses: Not hard at all   Food

## 2024-08-09 ENCOUNTER — PATIENT MESSAGE (OUTPATIENT)
Dept: FAMILY MEDICINE CLINIC | Age: 39
End: 2024-08-09

## 2024-08-09 RX ORDER — PREDNISONE 10 MG/1
TABLET ORAL
Qty: 32 TABLET | Refills: 0 | Status: SHIPPED | OUTPATIENT
Start: 2024-08-09 | End: 2024-08-23

## 2024-08-09 NOTE — TELEPHONE ENCOUNTER
From: Lonny Silva  To: Dr. Taran Pza  Sent: 8/9/2024 9:14 AM EDT  Subject: Rash    My rash seems to be spreading and I'm almost out of my steroid. You had mentioned about trying a stronget steroid if the rash didn't go away.

## 2024-08-09 NOTE — TELEPHONE ENCOUNTER
Has the itch improved?  What about how dark the rash is? Prednisone 40mg po daily x 3 days, then 30mg po daily x 3 days, then 20mg po daily x 3 days, then 10mg po daily x 3 days, then 5mg po daily x 3 days, then stop

## 2024-08-20 ENCOUNTER — PATIENT MESSAGE (OUTPATIENT)
Dept: FAMILY MEDICINE CLINIC | Age: 39
End: 2024-08-20

## 2024-08-20 RX ORDER — PREDNISONE 5 MG/1
5 TABLET ORAL DAILY
Qty: 7 TABLET | Refills: 0 | Status: SHIPPED | OUTPATIENT
Start: 2024-08-20 | End: 2024-08-27

## 2024-08-20 NOTE — TELEPHONE ENCOUNTER
Extend prednisone out an additional week by 5 mg daily for 7 days.  Okay to send in new prescription.  If ongoing symptoms, needs follow-up appointment versus dermatology referral

## 2024-10-21 ENCOUNTER — OUTSIDE SERVICES (OUTPATIENT)
Dept: SURGERY | Age: 39
End: 2024-10-21

## 2024-10-21 DIAGNOSIS — K56.609 SMALL BOWEL OBSTRUCTION (HCC): Primary | ICD-10-CM

## 2024-10-30 ENCOUNTER — OFFICE VISIT (OUTPATIENT)
Dept: FAMILY MEDICINE CLINIC | Age: 39
End: 2024-10-30
Payer: COMMERCIAL

## 2024-10-30 VITALS
WEIGHT: 234 LBS | BODY MASS INDEX: 32.76 KG/M2 | HEIGHT: 71 IN | OXYGEN SATURATION: 98 % | SYSTOLIC BLOOD PRESSURE: 108 MMHG | DIASTOLIC BLOOD PRESSURE: 66 MMHG | HEART RATE: 88 BPM

## 2024-10-30 DIAGNOSIS — F43.23 ADJUSTMENT DISORDER WITH MIXED ANXIETY AND DEPRESSED MOOD: Primary | ICD-10-CM

## 2024-10-30 DIAGNOSIS — D48.5 NEOPLASM OF UNCERTAIN BEHAVIOR OF SKIN: ICD-10-CM

## 2024-10-30 PROCEDURE — G8427 DOCREV CUR MEDS BY ELIG CLIN: HCPCS | Performed by: FAMILY MEDICINE

## 2024-10-30 PROCEDURE — 99213 OFFICE O/P EST LOW 20 MIN: CPT | Performed by: FAMILY MEDICINE

## 2024-10-30 PROCEDURE — G8417 CALC BMI ABV UP PARAM F/U: HCPCS | Performed by: FAMILY MEDICINE

## 2024-10-30 PROCEDURE — 1036F TOBACCO NON-USER: CPT | Performed by: FAMILY MEDICINE

## 2024-10-30 PROCEDURE — G8484 FLU IMMUNIZE NO ADMIN: HCPCS | Performed by: FAMILY MEDICINE

## 2024-10-30 RX ORDER — BUSPIRONE HYDROCHLORIDE 15 MG/1
15 TABLET ORAL 2 TIMES DAILY
Qty: 60 TABLET | Refills: 3 | Status: SHIPPED | OUTPATIENT
Start: 2024-10-30

## 2024-10-30 RX ORDER — DESVENLAFAXINE 50 MG/1
50 TABLET, FILM COATED, EXTENDED RELEASE ORAL DAILY
Qty: 30 TABLET | Refills: 3 | Status: SHIPPED | OUTPATIENT
Start: 2024-10-30

## 2024-10-30 ASSESSMENT — ENCOUNTER SYMPTOMS
DIARRHEA: 0
CONSTIPATION: 0

## 2024-10-30 NOTE — PROGRESS NOTES
Chief Complaint   Patient presents with    Discuss Medications    Skin Lesion       HPI:  Lonny Silva is a 39 y.o. (: 1985) here today to discuss medications and skin lesions on his back that he is concerned about.  HPI  Recently admitted to [facility] for SBO.  Had sig pain, n/v.  Was at work, began to feel nauseated.  Had fatigue.  Went home.  Had sig distention and pain next morning.  Several episodes of emesis.  To ER later that day.  Had NG tube placed.  Sxs improved w/ ambulation.      3 new skin lesions noted on back.  No known trauma or other abn findings noted.      Some inc stressors noted.  Has had inc stress, more chest tightness, not sleeping as well.  Wonders about inc pristiq.   Had just been taking buspar prn, but then taking more consistently over past few weeks and still some sxs.     When stress worse and sleep worse, headaches seem worse.  Using prn ibu.  Has been getting botox.  Otherwise that has been doing well.  Seems worse since inc stressors.        Patient's medications, allergies, past medical, surgical, social and family histories were reviewed and updated as appropriate.    ROS:  Review of Systems   Constitutional:  Negative for fever.   Gastrointestinal:  Negative for constipation and diarrhea.   Neurological:  Positive for headaches.   Psychiatric/Behavioral:  Positive for dysphoric mood and sleep disturbance. The patient is nervous/anxious.            No results found for: \"LABA1C\", \"LDLDIRECT\"    Past Medical History:   Diagnosis Date    GERD (gastroesophageal reflux disease)     Headache(784.0)     SIM (obstructive sleep apnea) 2023       Family History   Problem Relation Age of Onset    Cancer Maternal Grandmother         ovarian    Cancer Mother        Social History     Socioeconomic History    Marital status:      Spouse name: Not on file    Number of children: Not on file    Years of education: Not on file    Highest education level: Not on file

## 2024-11-07 ENCOUNTER — OUTSIDE SERVICES (OUTPATIENT)
Dept: SURGERY | Age: 39
End: 2024-11-07
Payer: COMMERCIAL

## 2024-11-07 DIAGNOSIS — K56.609 SMALL BOWEL OBSTRUCTION (HCC): Primary | ICD-10-CM

## 2024-11-07 PROCEDURE — 99213 OFFICE O/P EST LOW 20 MIN: CPT | Performed by: SURGERY

## 2024-11-07 NOTE — PROGRESS NOTES
[facility]      S:   Patient presents for follow up of recent admission for SBO - etiology unknown.  He reports no complaints.  He denies pain.  He is eating normally.    O:   Comfortable.  No distress. 145/89  5'11\"  229#  Chest CTA  Heart regular  Abdomen soft.  Non distended.  Non tender.    CT and AXR from admission reviewed.  AXR was unremarkable near discharge time.                      A:     Encounter Diagnosis   Name Primary?    Small bowel obstruction (HCC) Yes            P:   Resolved SBO.  No further testing or procedures needed.  Follow up as needed.

## 2025-02-04 ENCOUNTER — OFFICE VISIT (OUTPATIENT)
Dept: FAMILY MEDICINE CLINIC | Age: 40
End: 2025-02-04
Payer: COMMERCIAL

## 2025-02-04 VITALS
BODY MASS INDEX: 32.48 KG/M2 | DIASTOLIC BLOOD PRESSURE: 80 MMHG | OXYGEN SATURATION: 98 % | WEIGHT: 232 LBS | HEART RATE: 86 BPM | HEIGHT: 71 IN | SYSTOLIC BLOOD PRESSURE: 134 MMHG

## 2025-02-04 DIAGNOSIS — G89.29 CHRONIC LEFT SHOULDER PAIN: Primary | ICD-10-CM

## 2025-02-04 DIAGNOSIS — M25.512 CHRONIC LEFT SHOULDER PAIN: Primary | ICD-10-CM

## 2025-02-04 PROCEDURE — 1036F TOBACCO NON-USER: CPT | Performed by: FAMILY MEDICINE

## 2025-02-04 PROCEDURE — G8417 CALC BMI ABV UP PARAM F/U: HCPCS | Performed by: FAMILY MEDICINE

## 2025-02-04 PROCEDURE — 99213 OFFICE O/P EST LOW 20 MIN: CPT | Performed by: FAMILY MEDICINE

## 2025-02-04 PROCEDURE — G8427 DOCREV CUR MEDS BY ELIG CLIN: HCPCS | Performed by: FAMILY MEDICINE

## 2025-02-04 SDOH — ECONOMIC STABILITY: FOOD INSECURITY: WITHIN THE PAST 12 MONTHS, YOU WORRIED THAT YOUR FOOD WOULD RUN OUT BEFORE YOU GOT MONEY TO BUY MORE.: NEVER TRUE

## 2025-02-04 SDOH — ECONOMIC STABILITY: FOOD INSECURITY: WITHIN THE PAST 12 MONTHS, THE FOOD YOU BOUGHT JUST DIDN'T LAST AND YOU DIDN'T HAVE MONEY TO GET MORE.: NEVER TRUE

## 2025-02-04 ASSESSMENT — PATIENT HEALTH QUESTIONNAIRE - PHQ9
2. FEELING DOWN, DEPRESSED OR HOPELESS: NOT AT ALL
SUM OF ALL RESPONSES TO PHQ QUESTIONS 1-9: 0
SUM OF ALL RESPONSES TO PHQ9 QUESTIONS 1 & 2: 0
1. LITTLE INTEREST OR PLEASURE IN DOING THINGS: NOT AT ALL
SUM OF ALL RESPONSES TO PHQ QUESTIONS 1-9: 0

## 2025-02-04 ASSESSMENT — ENCOUNTER SYMPTOMS: SHORTNESS OF BREATH: 0

## 2025-02-04 NOTE — PROGRESS NOTES
Chief Complaint   Patient presents with    Shoulder Pain     Left shoulder pain       HPI:  Lonny Silva is a 39 y.o. (: 1985) here today   for left shoulder pain.  HPI  6m.  Worse w/ lifting heavy, royer barbell.  Has appt w/ chiropractor tomorrow.  No numbness or tingling noted.  No pain other times.  No cp, sob.  Noted w/ bench press initially.  If doing any overhead.  Worse w/ barbell, but can aggravate w/ dumbbell as well.  Pain really only present w/ weight.  Has had doing less rotator cuff    Patient's medications, allergies, past medical, surgical, social and family histories were reviewed and updated asappropriate.    ROS:  Review of Systems   Constitutional:  Negative for fever.   Respiratory:  Negative for shortness of breath.    Cardiovascular:  Negative for chest pain and palpitations.   Musculoskeletal:  Positive for arthralgias.           Prior to Visit Medications    Medication Sig Taking? Authorizing Provider   busPIRone (BUSPAR) 15 MG tablet Take 15 mg by mouth 2 times daily Yes Taran Paz MD   desvenlafaxine succinate (PRISTIQ) 50 MG TB24 extended release tablet Take 1 tablet by mouth daily Yes Taran Paz MD   ibuprofen (ADVIL;MOTRIN) 800 MG tablet Take 1 tablet by mouth every 6 hours as needed for Pain Yes ProviderBerny MD   pantoprazole (PROTONIX) 40 MG tablet Take by mouth daily Yes ProviderBerny MD       No Known Allergies    OBJECTIVE:    /80   Pulse 86   Ht 1.803 m (5' 11\")   Wt 105.2 kg (232 lb)   SpO2 98%   BMI 32.36 kg/m²     BP Readings from Last 2 Encounters:   25 134/80   10/30/24 108/66       Wt Readings from Last 3 Encounters:   25 105.2 kg (232 lb)   10/30/24 106.1 kg (234 lb)   24 107.5 kg (237 lb)       Physical Exam  Constitutional:       Appearance: Normal appearance.   HENT:      Head: Normocephalic and atraumatic.   Eyes:      Extraocular Movements: Extraocular movements intact.   Cardiovascular:      Rate 
  Food Insecurity: No Food Insecurity (2/4/2025)    Hunger Vital Sign     Worried About Running Out of Food in the Last Year: Never true     Ran Out of Food in the Last Year: Never true   Transportation Needs: No Transportation Needs (2/4/2025)    PRAPARE - Transportation     Lack of Transportation (Medical): No     Lack of Transportation (Non-Medical): No   Physical Activity: Not on file   Stress: Not on file   Social Connections: Not on file   Intimate Partner Violence: Unknown (1/19/2024)    [facility]    Interpersonal Safety     Feel physically or emotionally unsafe where currently live: Not on file     Harm by anyone: Not on file     Emotionally Harmed: Not on file   Housing Stability: Low Risk  (2/4/2025)    Housing Stability Vital Sign     Unable to Pay for Housing in the Last Year: No     Number of Times Moved in the Last Year: 0     Homeless in the Last Year: No       Prior to Visit Medications    Medication Sig Taking? Authorizing Provider   busPIRone (BUSPAR) 15 MG tablet Take 15 mg by mouth 2 times daily Yes Taran Paz MD   desvenlafaxine succinate (PRISTIQ) 50 MG TB24 extended release tablet Take 1 tablet by mouth daily Yes Taran Paz MD   ibuprofen (ADVIL;MOTRIN) 800 MG tablet Take 1 tablet by mouth every 6 hours as needed for Pain Yes ProviderBerny MD   pantoprazole (PROTONIX) 40 MG tablet Take by mouth daily Yes ProviderBerny MD       No Known Allergies    OBJECTIVE:    /80   Pulse 86   Ht 1.803 m (5' 11\")   Wt 105.2 kg (232 lb)   SpO2 98%   BMI 32.36 kg/m²     BP Readings from Last 2 Encounters:   02/04/25 134/80   10/30/24 108/66       Wt Readings from Last 3 Encounters:   02/04/25 105.2 kg (232 lb)   10/30/24 106.1 kg (234 lb)   08/06/24 107.5 kg (237 lb)       Physical Exam  Constitutional:       Appearance: Normal appearance.   HENT:      Head: Normocephalic and

## 2025-02-21 ENCOUNTER — OFFICE VISIT (OUTPATIENT)
Dept: ORTHOPEDIC SURGERY | Age: 40
End: 2025-02-21

## 2025-02-21 VITALS — BODY MASS INDEX: 31.5 KG/M2 | WEIGHT: 225 LBS | HEIGHT: 71 IN

## 2025-02-21 DIAGNOSIS — M25.512 LEFT SHOULDER PAIN, UNSPECIFIED CHRONICITY: Primary | ICD-10-CM

## 2025-02-21 NOTE — PROGRESS NOTES
Date:  2025    Name:  Lonny Silva  Address:  [address] [address]    :  1985      Age:   39 y.o.    SSN:  [id number]      Medical Record Number:  [id number]    Reason for Visit:    Chief Complaint    Shoulder Pain (NP LT. SHOULDER)      DOS:2025     HPI: Lonny Silva is a 39 y.o. male here today for new patient evaluation regarding his left shoulder    History of Present Illness  The patient is a 39-year-old male here today for a new patient evaluation regarding his left shoulder. He was referred to our clinic by his primary care physician, Dr. Paz. He is right-hand dominant and reports no known injury to his left shoulder. The onset of his symptoms was noted during a workout session on 2024, specifically while performing a bench press. He describes the pain as deep-seated and localized to the anterior aspect of his left shoulder, which is exacerbated during certain exercises such as  press or barbell push. Despite attempts to modify his workout routine, including substituting dumbbells for barbells, he continues to experience discomfort with heavier weights. He characterizes the pain as a pulling sensation, which does not radiate beyond the shoulder. He has no history of previous injuries or surgeries on the affected shoulder. He has not sought any pharmacological or therapeutic interventions for this issue, opting instead to manage the pain through exercise modification. The pain does not interfere with his sleep.           ROS: All systems reviewed on patient intake form.  Pertinent items are noted in HPI.        Past Medical History:   Diagnosis Date    GERD (gastroesophageal reflux disease)     Headache(784.0)     SIM (obstructive sleep apnea) 2023        Past Surgical History:   Procedure Laterality Date    ANTERIOR CRUCIATE LIGAMENT REPAIR      KNEE ARTHROPLASTY      KNEE ARTHROSCOPY         Family History   Problem Relation Age of Onset

## 2025-02-26 ENCOUNTER — TELEPHONE (OUTPATIENT)
Dept: ORTHOPEDIC SURGERY | Age: 40
End: 2025-02-26

## 2025-02-27 DIAGNOSIS — F43.23 ADJUSTMENT DISORDER WITH MIXED ANXIETY AND DEPRESSED MOOD: ICD-10-CM

## 2025-02-27 RX ORDER — DESVENLAFAXINE 50 MG/1
50 TABLET, FILM COATED, EXTENDED RELEASE ORAL DAILY
Qty: 30 TABLET | Refills: 5 | Status: SHIPPED | OUTPATIENT
Start: 2025-02-27

## 2025-03-06 ENCOUNTER — TELEPHONE (OUTPATIENT)
Dept: ORTHOPEDIC SURGERY | Age: 40
End: 2025-03-06

## 2025-03-06 NOTE — TELEPHONE ENCOUNTER
MRI LEFT SHOULDER approved auth# [id number]     Was approved for [facility]   Valid 2/21/2025 - 4/22/2025     SPOKE TO PT AND LET HIM KNOW MRI WAS APPROVED HE WILL CALL TO SCHEDULE.

## 2025-03-16 ENCOUNTER — HOSPITAL ENCOUNTER (OUTPATIENT)
Dept: MRI IMAGING | Age: 40
Discharge: HOME | End: 2025-03-16
Attending: STUDENT IN AN ORGANIZED HEALTH CARE EDUCATION/TRAINING PROGRAM
Payer: COMMERCIAL

## 2025-03-16 DIAGNOSIS — M25.512 LEFT SHOULDER PAIN, UNSPECIFIED CHRONICITY: ICD-10-CM

## 2025-03-16 PROCEDURE — 73221 MRI JOINT UPR EXTREM W/O DYE: CPT

## 2025-03-26 ENCOUNTER — RESULTS FOLLOW-UP (OUTPATIENT)
Dept: ORTHOPEDIC SURGERY | Age: 40
End: 2025-03-26

## 2025-03-26 DIAGNOSIS — M25.512 LEFT SHOULDER PAIN, UNSPECIFIED CHRONICITY: Primary | ICD-10-CM

## 2025-03-26 NOTE — TELEPHONE ENCOUNTER
----- Message from Dr. Sameer Fernandez DO sent at 3/23/2025  8:16 PM EDT -----  MRI encouraging, mild changes, no plan for surgery at this time. Recommend PT and NSAIDs and I can see back in 2 months if not better  ----- Message -----  From: [place] Incoming Radiology Results From [facility]  Sent: 3/17/2025   3:43 PM EDT  To: Sameer Fernandez DO

## 2025-04-03 ENCOUNTER — HOSPITAL ENCOUNTER (OUTPATIENT)
Dept: PHYSICAL THERAPY | Age: 40
Setting detail: THERAPIES SERIES
Discharge: HOME | End: 2025-04-03
Payer: COMMERCIAL

## 2025-04-03 DIAGNOSIS — M62.81 MUSCLE WEAKNESS OF LEFT UPPER EXTREMITY: Primary | ICD-10-CM

## 2025-04-03 DIAGNOSIS — M25.512 LEFT SHOULDER PAIN, UNSPECIFIED CHRONICITY: ICD-10-CM

## 2025-04-03 PROCEDURE — 97161 PT EVAL LOW COMPLEX 20 MIN: CPT

## 2025-04-03 PROCEDURE — 97140 MANUAL THERAPY 1/> REGIONS: CPT

## 2025-04-03 PROCEDURE — 97110 THERAPEUTIC EXERCISES: CPT

## 2025-04-03 NOTE — PLAN OF CARE
physician  [] Other:     TREATMENT PLAN     Frequency/Duration: 1-2x/week for  12  weeks for the following treatment interventions:    Interventions:  Therapeutic Exercise (43581) including: strength training, ROM, and functional mobility  Therapeutic Activities (76236) including: functional mobility training and education.  Neuromuscular Re-education (51423) activation and proprioception, including postural re-education.    Manual Therapy (99739) as indicated to include: Passive Range of Motion, Gr I-IV mobilizations, Soft Tissue Mobilization, Trigger Point Release, and Myofascial Release  Modalities as needed that may include: Cryotherapy, Electrical Stimulation, Thermal Agents, and Vasoneumatic Compression  Patient education on joint protection, postural re-education, activity modification, and progression of HEP    Plan: POC initiated as per evaluation    Electronically Signed by Terri Izaguirre PT  Date: 04/03/2025     Note: Portions of this note have been templated and/or copied from initial evaluation, reassessments and prior notes for documentation efficiency.    Note: If patient does not return for scheduled/recommended follow up visits, this note will serve as a discharge from care along with the most recent update on progress.    Ortho Evaluation

## 2025-04-09 ENCOUNTER — HOSPITAL ENCOUNTER (OUTPATIENT)
Dept: PHYSICAL THERAPY | Age: 40
Setting detail: THERAPIES SERIES
Discharge: HOME | End: 2025-04-09
Payer: COMMERCIAL

## 2025-04-09 PROCEDURE — 97110 THERAPEUTIC EXERCISES: CPT

## 2025-04-09 PROCEDURE — 97112 NEUROMUSCULAR REEDUCATION: CPT

## 2025-04-09 NOTE — FLOWSHEET NOTE
[facility] - Outpatient Rehabilitation and Therapy: [address], [place] [address] office: [phone] fax: [phone]         Physical Therapy: TREATMENT/PROGRESS NOTE   Patient: Lonny Silva (39 y.o. male)   Examination Date: 2025   :  1985 MRN: [id number]   Visit #: 2   Insurance Allowable Auth Needed   Auth required after 30 visits [x]Yes    []No    Insurance: Payor: [employer] / Plan: [employer] [place] MEDICA[place] / Product Type: *No Product type* /   Insurance ID: [id number] - (Medicaid Managed)  Secondary Insurance (if applicable):    Treatment Diagnosis:     ICD-10-CM    1. Muscle weakness of left upper extremity  M62.81       2. Left shoulder pain, unspecified chronicity  M25.512          Medical Diagnosis:  Left shoulder pain, unspecified chronicity [M25.512]   Referring Physician: Sameer Fernandez DO  PCP: Taran Paz MD     Plan of care signed (Y/N):     Date of Patient follow up with Physician:      Plan of Care Report: NO  POC update due: (10 visits /OR AUTH LIMITS, whichever is less)  2025                                             Medical History:  Comorbidities:  Anxiety  Relevant Medical History:   Past Medical History:   Diagnosis Date    GERD (gastroesophageal reflux disease)     Headache(784.0)     SIM (obstructive sleep apnea) 2023                                              Precautions/ Contra-indications:           Latex allergy:  NO  Pacemaker:    NO  Contraindications for Manipulation: None  Date of Surgery: n/a  Other:    Red Flags:  None    Suicide Screening:   The patient did not verbalize a primary behavioral concern, suicidal ideation, suicidal intent, or demonstrate suicidal behaviors.    Preferred Language for Healthcare:   [x] English       [] other:    SUBJECTIVE EXAMINATION     Patient stated complaint: Pt denies having any pain since evaluation. Pt reports he has not been able to complete HEP due to busy schedule. Pt

## 2025-04-14 ENCOUNTER — HOSPITAL ENCOUNTER (OUTPATIENT)
Dept: PHYSICAL THERAPY | Age: 40
Setting detail: THERAPIES SERIES
End: 2025-04-14
Payer: COMMERCIAL

## 2025-04-16 ENCOUNTER — HOSPITAL ENCOUNTER (OUTPATIENT)
Dept: PHYSICAL THERAPY | Age: 40
Setting detail: THERAPIES SERIES
Discharge: HOME | End: 2025-04-16
Payer: COMMERCIAL

## 2025-04-16 PROCEDURE — 97140 MANUAL THERAPY 1/> REGIONS: CPT

## 2025-04-16 PROCEDURE — 97110 THERAPEUTIC EXERCISES: CPT

## 2025-04-16 NOTE — FLOWSHEET NOTE
[facility] - Outpatient Rehabilitation and Therapy: [address], [place] [address] office: [phone] fax: [phone]         Physical Therapy: TREATMENT/PROGRESS NOTE   Patient: Lonny Silva (39 y.o. male)   Examination Date: 2025   :  1985 MRN: [id number]   Visit #: 3   Insurance Allowable Auth Needed   Auth required after 30 visits [x]Yes    []No    Insurance: Payor: [employer] / Plan: [employer] [place] MEDICAID / Product Type: *No Product type* /   Insurance ID: [id number] - (Medicaid Managed)  Secondary Insurance (if applicable):    Treatment Diagnosis:     ICD-10-CM    1. Muscle weakness of left upper extremity  M62.81       2. Left shoulder pain, unspecified chronicity  M25.512          Medical Diagnosis:  Left shoulder pain, unspecified chronicity [M25.512]   Referring Physician: Sameer Fernandez DO  PCP: Taran Paz MD     Plan of care signed (Y/N):     Date of Patient follow up with Physician:      Plan of Care Report: NO  POC update due: (10 visits /OR AUTH LIMITS, whichever is less)  2025                                             Medical History:  Comorbidities:  Anxiety  Relevant Medical History:   Past Medical History:   Diagnosis Date    GERD (gastroesophageal reflux disease)     Headache(784.0)     SIM (obstructive sleep apnea) 2023                                              Precautions/ Contra-indications:           Latex allergy:  NO  Pacemaker:    NO  Contraindications for Manipulation: None  Date of Surgery: n/a  Other:    Red Flags:  None    Suicide Screening:   The patient did not verbalize a primary behavioral concern, suicidal ideation, suicidal intent, or demonstrate suicidal behaviors.    Preferred Language for Healthcare:   [x] English       [] other:    SUBJECTIVE EXAMINATION     Patient stated complaint: Pt states he has no pain at current time. Pt reports he completed a dumbell workout with little to no pain and he

## 2025-04-21 ENCOUNTER — HOSPITAL ENCOUNTER (OUTPATIENT)
Dept: PHYSICAL THERAPY | Age: 40
Setting detail: THERAPIES SERIES
Discharge: HOME | End: 2025-04-21
Payer: COMMERCIAL

## 2025-04-21 PROCEDURE — 97140 MANUAL THERAPY 1/> REGIONS: CPT

## 2025-04-21 PROCEDURE — 97110 THERAPEUTIC EXERCISES: CPT

## 2025-04-21 NOTE — FLOWSHEET NOTE
[facility] - Outpatient Rehabilitation and Therapy: [address], [place] [address] office: [phone] fax: [phone]         Physical Therapy: TREATMENT/PROGRESS NOTE   Patient: Lonny Silva (39 y.o. male)   Examination Date: 2025   :  1985 MRN: [id number]   Visit #: 4   Insurance Allowable Auth Needed   Auth required after 30 visits [x]Yes    []No    Insurance: Payor: [employer] / Plan: [employer] [place] MEDICAID / Product Type: *No Product type* /   Insurance ID: [id number] - (Medicaid Managed)  Secondary Insurance (if applicable):    Treatment Diagnosis:     ICD-10-CM    1. Muscle weakness of left upper extremity  M62.81       2. Left shoulder pain, unspecified chronicity  M25.512          Medical Diagnosis:  Left shoulder pain, unspecified chronicity [M25.512]   Referring Physician: Sameer Fernandez DO  PCP: Taran Paz MD     Plan of care signed (Y/N):     Date of Patient follow up with Physician:      Plan of Care Report: NO  POC update due: (10 visits /OR AUTH LIMITS, whichever is less)  2025                                             Medical History:  Comorbidities:  Anxiety  Relevant Medical History:   Past Medical History:   Diagnosis Date    GERD (gastroesophageal reflux disease)     Headache(784.0)     SIM (obstructive sleep apnea) 2023                                              Precautions/ Contra-indications:           Latex allergy:  NO  Pacemaker:    NO  Contraindications for Manipulation: None  Date of Surgery: n/a  Other:    Red Flags:  None    Suicide Screening:   The patient did not verbalize a primary behavioral concern, suicidal ideation, suicidal intent, or demonstrate suicidal behaviors.    Preferred Language for Healthcare:   [x] English       [] other:    SUBJECTIVE EXAMINATION     Patient stated complaint: Pt stated no issues after LV, stated feels good. Stated has adjusted his workouts to dumbell vs barbell with bench

## 2025-04-23 ENCOUNTER — HOSPITAL ENCOUNTER (OUTPATIENT)
Dept: PHYSICAL THERAPY | Age: 40
Setting detail: THERAPIES SERIES
End: 2025-04-23
Payer: COMMERCIAL

## 2025-04-28 ENCOUNTER — HOSPITAL ENCOUNTER (OUTPATIENT)
Dept: PHYSICAL THERAPY | Age: 40
Setting detail: THERAPIES SERIES
Discharge: HOME | End: 2025-04-28
Payer: COMMERCIAL

## 2025-04-28 PROCEDURE — 97140 MANUAL THERAPY 1/> REGIONS: CPT

## 2025-04-28 PROCEDURE — 97112 NEUROMUSCULAR REEDUCATION: CPT

## 2025-04-28 PROCEDURE — 97110 THERAPEUTIC EXERCISES: CPT

## 2025-04-28 NOTE — FLOWSHEET NOTE
increase flexibility, following either an injury or surgery.   (26882) MANUAL THERAPY -  Manual therapy techniques, 1 or more regions, each 15 minutes (Mobilization/manipulation, manual lymphatic drainage, manual traction) for the purpose of modulating pain, promoting relaxation,  increasing ROM, reducing/eliminating soft tissue swelling/inflammation/restriction, improving soft tissue extensibility and allowing for proper ROM for normal function with self care, mobility, lifting and ambulation    GOALS     Patient stated goal: To be able to return to normal weight lifting and work without pain.  [] Progressing: [] Met: [] Not Met: [] Adjusted    Therapist goals for Patient:   Short Term Goals: To be achieved in: 2 weeks  1Independent in HEP and progression per patient tolerance, in order to prevent re-injury.   [] Progressing: [] Met: [] Not Met: [] Adjusted  Patient will have a decrease in pain to <1-2/10 to facilitate improvement in movement, function, and ADLs as indicated by Functional Deficits.  [] Progressing: [] Met: [] Not Met: [] Adjusted        Long Term Goals: To be achieved in: 12 weeks  Disability index score of 20% or less for the Quick DASH to assist with reaching prior level of function with activities such as lifting overhead and away from the body.  [] Progressing: [] Met: [] Not Met: [] Adjusted  Patient will demonstrate increased AROM of L shoulder grossly = R shoulder  without pain to allow for proper joint functioning to enable patient to be able reach behind back without pain.   [] Progressing: [] Met: [] Not Met: [] Adjusted  Patient will demonstrate increased Strength of L UE grossly to at least 5/5 throughout without pain to allow for proper functional mobility to enable patient to return to be able to bench press without increased pain.   [] Progressing: [] Met: [] Not Met: [] Adjusted  Patient will return to weight lifting in all presses/overhead with prior level weights without increased

## 2025-04-30 DIAGNOSIS — F43.23 ADJUSTMENT DISORDER WITH MIXED ANXIETY AND DEPRESSED MOOD: ICD-10-CM

## 2025-04-30 RX ORDER — BUSPIRONE HYDROCHLORIDE 15 MG/1
15 TABLET ORAL 2 TIMES DAILY
Qty: 180 TABLET | Refills: 3 | Status: SHIPPED | OUTPATIENT
Start: 2025-04-30

## 2025-05-01 ENCOUNTER — HOSPITAL ENCOUNTER (OUTPATIENT)
Dept: PHYSICAL THERAPY | Age: 40
Setting detail: THERAPIES SERIES
Discharge: HOME | End: 2025-05-01
Payer: COMMERCIAL

## 2025-05-01 PROCEDURE — 97110 THERAPEUTIC EXERCISES: CPT | Performed by: PHYSICAL THERAPY ASSISTANT

## 2025-05-01 PROCEDURE — 97112 NEUROMUSCULAR REEDUCATION: CPT | Performed by: PHYSICAL THERAPY ASSISTANT

## 2025-05-01 PROCEDURE — 97140 MANUAL THERAPY 1/> REGIONS: CPT | Performed by: PHYSICAL THERAPY ASSISTANT

## 2025-05-05 ENCOUNTER — HOSPITAL ENCOUNTER (OUTPATIENT)
Dept: PHYSICAL THERAPY | Age: 40
Setting detail: THERAPIES SERIES
Discharge: HOME | End: 2025-05-05
Payer: COMMERCIAL

## 2025-05-05 PROCEDURE — 97140 MANUAL THERAPY 1/> REGIONS: CPT

## 2025-05-05 PROCEDURE — 97112 NEUROMUSCULAR REEDUCATION: CPT

## 2025-05-05 PROCEDURE — 97110 THERAPEUTIC EXERCISES: CPT

## 2025-05-05 NOTE — FLOWSHEET NOTE
[facility] - Outpatient Rehabilitation and Therapy: [address], [place] [address] office: [phone] fax: [phone]      Physical Therapy: TREATMENT/PROGRESS NOTE   Patient: Lonny Silva (39 y.o. male)   Examination Date: 2025   :  1985 MRN: [id number]   Visit #: 7   Insurance Allowable Auth Needed   Auth required after 30 visits [x]Yes    []No    Insurance: Payor: [employer] / Plan: [employer] [place] MEDICAID / Product Type: *No Product type* /   Insurance ID: [id number] - (Medicaid Managed)  Secondary Insurance (if applicable):    Treatment Diagnosis:     ICD-10-CM    1. Muscle weakness of left upper extremity  M62.81       2. Left shoulder pain, unspecified chronicity  M25.512          Medical Diagnosis:  Left shoulder pain, unspecified chronicity [M25.512]   Referring Physician: Sameer Fernandez DO  PCP: Taran Paz MD     Plan of care signed (Y/N):     Date of Patient follow up with Physician:      Plan of Care Report: NO  POC update due: (10 visits /OR AUTH LIMITS, whichever is less)  2025                                             Medical History:  Comorbidities:  Anxiety  Relevant Medical History:   Past Medical History:   Diagnosis Date    GERD (gastroesophageal reflux disease)     Headache(784.0)     SIM (obstructive sleep apnea) 2023                                              Precautions/ Contra-indications:           Latex allergy:  NO  Pacemaker:    NO  Contraindications for Manipulation: None  Date of Surgery: n/a  Other:    Red Flags:  None    Suicide Screening:   The patient did not verbalize a primary behavioral concern, suicidal ideation, suicidal intent, or demonstrate suicidal behaviors.    Preferred Language for Healthcare:   [x] English       [] other:    SUBJECTIVE EXAMINATION     Patient stated complaint: Pt reports no new complaints, stated he is a little tired from working overnight     Test used Initial score  4/3/25

## 2025-05-07 ENCOUNTER — HOSPITAL ENCOUNTER (OUTPATIENT)
Dept: PHYSICAL THERAPY | Age: 40
Setting detail: THERAPIES SERIES
Discharge: HOME | End: 2025-05-07
Payer: COMMERCIAL

## 2025-05-07 PROCEDURE — 97112 NEUROMUSCULAR REEDUCATION: CPT

## 2025-05-07 PROCEDURE — 97140 MANUAL THERAPY 1/> REGIONS: CPT

## 2025-05-07 PROCEDURE — 97110 THERAPEUTIC EXERCISES: CPT

## 2025-05-07 NOTE — FLOWSHEET NOTE
an injury or surgery.   (23415) NEUROMUSCULAR RE-EDUCATION - Provided therapeutic procedure on activities related to neuromuscular reeducation of movement, balance, coordination, kinesthetic sense, posture, and/or proprioception for sitting and/or standing activities. Provided HEP review and/or progression.  (31816) MANUAL THERAPY -  Manual therapy techniques, 1 or more regions, each 15 minutes (Mobilization/manipulation, manual lymphatic drainage, manual traction) for the purpose of modulating pain, promoting relaxation,  increasing ROM, reducing/eliminating soft tissue swelling/inflammation/restriction, improving soft tissue extensibility and allowing for proper ROM for normal function with self care, mobility, lifting and ambulation    GOALS     Patient stated goal: To be able to return to normal weight lifting and work without pain.  [x] Progressing: [] Met: [] Not Met: [] Adjusted    Therapist goals for Patient:   Short Term Goals: To be achieved in: 2 weeks  1Independent in HEP and progression per patient tolerance, in order to prevent re-injury.   [] Progressing: [x] Met: [] Not Met: [] Adjusted  Patient will have a decrease in pain to <1-2/10 to facilitate improvement in movement, function, and ADLs as indicated by Functional Deficits.  [x] Progressing: [] Met: [] Not Met: [] Adjusted        Long Term Goals: To be achieved in: 12 weeks  Disability index score of 20% or less for the Quick DASH to assist with reaching prior level of function with activities such as lifting overhead and away from the body.  [] Progressing: [x] Met: [] Not Met: [] Adjusted  Patient will demonstrate increased AROM of L shoulder grossly = R shoulder  without pain to allow for proper joint functioning to enable patient to be able reach behind back without pain.   [x] Progressing: [] Met: [] Not Met: [] Adjusted  Patient will demonstrate increased Strength of L UE grossly to at least 5/5 throughout without pain to allow for proper

## 2025-05-12 ENCOUNTER — HOSPITAL ENCOUNTER (OUTPATIENT)
Dept: PHYSICAL THERAPY | Age: 40
Setting detail: THERAPIES SERIES
Discharge: HOME | End: 2025-05-12
Payer: COMMERCIAL

## 2025-05-12 PROCEDURE — 97112 NEUROMUSCULAR REEDUCATION: CPT

## 2025-05-12 PROCEDURE — 97110 THERAPEUTIC EXERCISES: CPT

## 2025-05-12 NOTE — FLOWSHEET NOTE
[facility] - Outpatient Rehabilitation and Therapy: [address], [place] [address] office: [phone] fax: [phone]      Physical Therapy: TREATMENT/PROGRESS NOTE   Patient: Lonny Silva (39 y.o. male)   Examination Date: 2025   :  1985 MRN: [id number]   Visit #: 9   Insurance Allowable Auth Needed   Auth required after 30 visits [x]Yes    []No    Insurance: Payor: [employer] / Plan: [employer] [place] MEDICAID / Product Type: *No Product type* /   Insurance ID: [id number] - (Medicaid Managed)  Secondary Insurance (if applicable):    Treatment Diagnosis:     ICD-10-CM    1. Muscle weakness of left upper extremity  M62.81       2. Left shoulder pain, unspecified chronicity  M25.512          Medical Diagnosis:  Left shoulder pain, unspecified chronicity [M25.512]   Referring Physician: Sameer Fernandez DO  PCP: Taran Paz MD     Plan of care signed (Y/N):     Date of Patient follow up with Physician:      Plan of Care Report: NO  POC update due: (10 visits /OR AUTH LIMITS, whichever is less)  2025                                             Medical History:  Comorbidities:  Anxiety  Relevant Medical History:   Past Medical History:   Diagnosis Date    GERD (gastroesophageal reflux disease)     Headache(784.0)     SIM (obstructive sleep apnea) 2023                                              Precautions/ Contra-indications:           Latex allergy:  NO  Pacemaker:    NO  Contraindications for Manipulation: None  Date of Surgery: n/a  Other:    Red Flags:  None    Suicide Screening:   The patient did not verbalize a primary behavioral concern, suicidal ideation, suicidal intent, or demonstrate suicidal behaviors.    Preferred Language for Healthcare:   [x] English       [] other:    SUBJECTIVE EXAMINATION     Patient stated complaint: Pt reports feeling better overall, states it gets tired with lots of activity.     Test used Initial score  4/3/25

## 2025-05-15 ENCOUNTER — HOSPITAL ENCOUNTER (OUTPATIENT)
Dept: PHYSICAL THERAPY | Age: 40
Setting detail: THERAPIES SERIES
End: 2025-05-15
Payer: COMMERCIAL

## 2025-05-20 ENCOUNTER — HOSPITAL ENCOUNTER (OUTPATIENT)
Dept: PHYSICAL THERAPY | Age: 40
Setting detail: THERAPIES SERIES
Discharge: HOME | End: 2025-05-20
Payer: COMMERCIAL

## 2025-05-20 PROCEDURE — 97110 THERAPEUTIC EXERCISES: CPT | Performed by: PHYSICAL THERAPY ASSISTANT

## 2025-05-20 PROCEDURE — 97112 NEUROMUSCULAR REEDUCATION: CPT | Performed by: PHYSICAL THERAPY ASSISTANT

## 2025-05-20 NOTE — DISCHARGE SUMMARY
Typically 20 minutes face-to-face)     Neuromusc. Re-ed (55316) 16' 1  Re-Eval (29324)     Manual (32532)    Estim Unattended (49574)     Ther. Act (76801)    Mech. Traction (40746)     Gait (81238)    Dry Needle 1-2 muscle (47584)     Aquatic Therex (47095)    Dry Needle 3+ muscle (20561)     Iontophoresis (00186)    VASO (44131)     Ultrasound (18822)    Group Therapy (48470)     Estim Attended (65753)    Canalith Repositioning (89595)     Physical Performance Test (11939)    Custom orthotic ()     Other:    Other:    Total Timed Code Tx Minutes 63' 4       Total Treatment Minutes 63'        Charge Justification:  (85776) THERAPEUTIC EXERCISE - Provided verbal/tactile cueing for HEP and/or activities related to strengthening, flexibility, endurance, ROM performed to prevent loss of range of motion, maintain or improve muscular strength or increase flexibility, following either an injury or surgery.   (98590) NEUROMUSCULAR RE-EDUCATION - Provided therapeutic procedure on activities related to neuromuscular reeducation of movement, balance, coordination, kinesthetic sense, posture, and/or proprioception for sitting and/or standing activities. Provided HEP review and/or progression.  (61377) MANUAL THERAPY -  Manual therapy techniques, 1 or more regions, each 15 minutes (Mobilization/manipulation, manual lymphatic drainage, manual traction) for the purpose of modulating pain, promoting relaxation,  increasing ROM, reducing/eliminating soft tissue swelling/inflammation/restriction, improving soft tissue extensibility and allowing for proper ROM for normal function with self care, mobility, lifting and ambulation    GOALS     Patient stated goal: To be able to return to normal weight lifting and work without pain.  [] Progressing: [] Met: [x] Not Met: [] Adjusted    Therapist goals for Patient:   Short Term Goals: To be achieved in: 2 weeks  1Independent in HEP and progression per patient tolerance, in order to

## 2025-05-27 ENCOUNTER — OFFICE VISIT (OUTPATIENT)
Dept: FAMILY MEDICINE CLINIC | Age: 40
End: 2025-05-27
Payer: COMMERCIAL

## 2025-05-27 VITALS
SYSTOLIC BLOOD PRESSURE: 122 MMHG | DIASTOLIC BLOOD PRESSURE: 86 MMHG | HEART RATE: 88 BPM | OXYGEN SATURATION: 98 % | HEIGHT: 71 IN | WEIGHT: 235 LBS | BODY MASS INDEX: 32.9 KG/M2

## 2025-05-27 DIAGNOSIS — F43.23 ADJUSTMENT DISORDER WITH MIXED ANXIETY AND DEPRESSED MOOD: Primary | ICD-10-CM

## 2025-05-27 PROCEDURE — 99213 OFFICE O/P EST LOW 20 MIN: CPT | Performed by: FAMILY MEDICINE

## 2025-05-27 PROCEDURE — G8427 DOCREV CUR MEDS BY ELIG CLIN: HCPCS | Performed by: FAMILY MEDICINE

## 2025-05-27 PROCEDURE — G8417 CALC BMI ABV UP PARAM F/U: HCPCS | Performed by: FAMILY MEDICINE

## 2025-05-27 PROCEDURE — 1036F TOBACCO NON-USER: CPT | Performed by: FAMILY MEDICINE

## 2025-05-27 NOTE — PROGRESS NOTES
Chief Complaint   Patient presents with    Insomnia    Anxiety       HPI:  Lonny Silva is a 39 y.o. (: 1985) here today   for trouble sleeping and increased anxiety.  HPI  Has been going to PT for left shoulder issues. Had seen ortho.  Overall some improvement.  HEP.      Some inc stressors noted.  Had been taking buspar just at night.  Began to take med bid.  Feels still having some issues.  Chest tightness, less sleep, inc anxiety noted.  Stress level has been worse, some occas headaches noted as well.      Patient's medications, allergies, past medical, surgical, social and family histories were reviewed and updated as appropriate.    ROS:  Review of Systems   Constitutional:  Negative for fever.   Neurological:  Positive for headaches.   Psychiatric/Behavioral:  Positive for decreased concentration, dysphoric mood and sleep disturbance. The patient is nervous/anxious.            Prior to Visit Medications    Medication Sig Taking? Authorizing Provider   busPIRone (BUSPAR) 15 MG tablet TAKE ONE TABLET BY MOUTH TWO TIMES A DAY Yes Taran Paz MD   desvenlafaxine succinate (PRISTIQ) 50 MG TB24 extended release tablet Take 1 tablet by mouth daily Yes Taran Paz MD   ibuprofen (ADVIL;MOTRIN) 800 MG tablet Take 1 tablet by mouth every 6 hours as needed for Pain Yes ProviderBerny MD   pantoprazole (PROTONIX) 40 MG tablet Take by mouth daily Yes ProviderBerny MD       No Known Allergies    OBJECTIVE:    /86   Pulse 88   Ht 1.803 m (5' 11\")   Wt 106.6 kg (235 lb)   SpO2 98%   BMI 32.78 kg/m²     BP Readings from Last 2 Encounters:   25 122/86   25 134/80       Wt Readings from Last 3 Encounters:   25 106.6 kg (235 lb)   25 102.1 kg (225 lb)   25 105.2 kg (232 lb)       Physical Exam  Constitutional:       Appearance: Normal appearance.   HENT:      Head: Normocephalic and atraumatic.   Eyes:      Extraocular Movements: Extraocular

## 2025-05-27 NOTE — PATIENT INSTRUCTIONS
Trial of otc b12, folate, magnesium (glycinate, L-threonate).  Magnesium breakthrough (bioptimizers)

## 2025-06-03 ENCOUNTER — OFFICE VISIT (OUTPATIENT)
Dept: FAMILY MEDICINE CLINIC | Age: 40
End: 2025-06-03
Payer: COMMERCIAL

## 2025-06-03 VITALS
OXYGEN SATURATION: 98 % | DIASTOLIC BLOOD PRESSURE: 80 MMHG | HEART RATE: 80 BPM | SYSTOLIC BLOOD PRESSURE: 122 MMHG | BODY MASS INDEX: 32.64 KG/M2 | WEIGHT: 234 LBS | RESPIRATION RATE: 20 BRPM

## 2025-06-03 DIAGNOSIS — F43.20 GRIEF REACTION: Primary | ICD-10-CM

## 2025-06-03 DIAGNOSIS — F43.23 ADJUSTMENT DISORDER WITH MIXED ANXIETY AND DEPRESSED MOOD: ICD-10-CM

## 2025-06-03 DIAGNOSIS — F51.02 ADJUSTMENT INSOMNIA: ICD-10-CM

## 2025-06-03 DIAGNOSIS — F41.9 ANXIETY: ICD-10-CM

## 2025-06-03 PROCEDURE — G8427 DOCREV CUR MEDS BY ELIG CLIN: HCPCS | Performed by: FAMILY MEDICINE

## 2025-06-03 PROCEDURE — 99214 OFFICE O/P EST MOD 30 MIN: CPT | Performed by: FAMILY MEDICINE

## 2025-06-03 PROCEDURE — G8417 CALC BMI ABV UP PARAM F/U: HCPCS | Performed by: FAMILY MEDICINE

## 2025-06-03 PROCEDURE — 1036F TOBACCO NON-USER: CPT | Performed by: FAMILY MEDICINE

## 2025-06-03 RX ORDER — NORTRIPTYLINE HYDROCHLORIDE 10 MG/1
10 CAPSULE ORAL NIGHTLY
Qty: 30 CAPSULE | Refills: 0 | Status: SHIPPED | OUTPATIENT
Start: 2025-06-03

## 2025-06-03 ASSESSMENT — ENCOUNTER SYMPTOMS
SHORTNESS OF BREATH: 0
CONSTIPATION: 0
DIARRHEA: 0
NAUSEA: 0
VOMITING: 0

## 2025-06-03 NOTE — PROGRESS NOTES
Lonny Silva (:  1985) is a 39 y.o. male,Established patient, here for evaluation of the following chief complaint(s):  Other (Discuss meds being adjusted not sleeping)           Assessment & Plan  Grief reaction    Follow-up with behavioral health counseling.  I did give him the phone number for the [facility] in [place].  I did advise that the counseling will be as effective or more effective than medication particularly for grief.         Adjustment disorder with mixed anxiety and depressed mood    Continue Pristiq and BuSpar         Anxiety    Continue BuSpar         Adjustment insomnia    Added nortriptyline 10 mg at bedtime follow-up in 2 weeks.    Orders:    nortriptyline (PAMELOR) 10 MG capsule; Take 1 capsule by mouth nightly      Return in about 2 weeks (around 2025) for insomnia.       Subjective   HPI  39-year-old male here for a follow-up visit.  Indicates the increased dose on the BuSpar made by Dr. Paz has been helpful in terms of the anxiety but he still having difficulty with sleep.    Review of Systems   Constitutional:  Negative for chills and fever.   Respiratory:  Negative for shortness of breath.    Cardiovascular:  Negative for chest pain.   Gastrointestinal:  Negative for constipation, diarrhea, nausea and vomiting.   Genitourinary:  Negative for dysuria.   Skin:  Negative for rash.   Neurological:  Negative for headaches.          Objective   Vitals:    25 0908   BP: 122/80   Pulse: 80   Resp: 20   SpO2: 98%     Physical Exam  Constitutional:       Appearance: Normal appearance.   HENT:      Head: Normocephalic and atraumatic.   Eyes:      Extraocular Movements: Extraocular movements intact.   Neurological:      General: No focal deficit present.      Mental Status: He is alert.   Psychiatric:         Attention and Perception: He is inattentive.         Mood and Affect: Affect is flat.         Behavior: Behavior is agitated.         Thought Content:

## 2025-06-17 ENCOUNTER — OFFICE VISIT (OUTPATIENT)
Dept: FAMILY MEDICINE CLINIC | Age: 40
End: 2025-06-17
Payer: COMMERCIAL

## 2025-06-17 VITALS
BODY MASS INDEX: 32.34 KG/M2 | SYSTOLIC BLOOD PRESSURE: 122 MMHG | DIASTOLIC BLOOD PRESSURE: 74 MMHG | HEART RATE: 82 BPM | OXYGEN SATURATION: 98 % | HEIGHT: 71 IN | WEIGHT: 231 LBS

## 2025-06-17 DIAGNOSIS — F43.23 ADJUSTMENT DISORDER WITH MIXED ANXIETY AND DEPRESSED MOOD: Primary | ICD-10-CM

## 2025-06-17 DIAGNOSIS — F51.02 ADJUSTMENT INSOMNIA: ICD-10-CM

## 2025-06-17 PROCEDURE — G8417 CALC BMI ABV UP PARAM F/U: HCPCS | Performed by: FAMILY MEDICINE

## 2025-06-17 PROCEDURE — 1036F TOBACCO NON-USER: CPT | Performed by: FAMILY MEDICINE

## 2025-06-17 PROCEDURE — 99213 OFFICE O/P EST LOW 20 MIN: CPT | Performed by: FAMILY MEDICINE

## 2025-06-17 PROCEDURE — G8427 DOCREV CUR MEDS BY ELIG CLIN: HCPCS | Performed by: FAMILY MEDICINE

## 2025-06-17 RX ORDER — NORTRIPTYLINE HYDROCHLORIDE 10 MG/1
20 CAPSULE ORAL NIGHTLY
Qty: 60 CAPSULE | Refills: 0
Start: 2025-06-17

## 2025-06-17 RX ORDER — BUSPIRONE HYDROCHLORIDE 15 MG/1
15 TABLET ORAL 3 TIMES DAILY
Qty: 270 TABLET | Refills: 3
Start: 2025-06-17

## 2025-06-17 NOTE — PROGRESS NOTES
Chief Complaint   Patient presents with    Follow-up     2 week follow up       HPI:  Lonny Silva is a 39 y.o. (: 1985) here today   for 2 week follow up.  HPI  Prescribed nortriptyline last visit.  Work sched contrib to sleep issues as well.  Med has helped somewhat.  Preet other meds.  Still some diff w/ sleep.  Buspar has helped anxiety somewhat.  Has been taking magnesium gummy at bedtime as well.      Has been seeing therapist periodically.  Approx monthly.  Cost is an issue.      Patient's medications, allergies, past medical, surgical, social and family histories were reviewed and updated as appropriate.    ROS:  Review of Systems   Constitutional:  Negative for fever.   Psychiatric/Behavioral:  Positive for dysphoric mood and sleep disturbance. The patient is nervous/anxious.            No results found for: \"LABA1C\", \"LDLDIRECT\"    Past Medical History:   Diagnosis Date    GERD (gastroesophageal reflux disease)     Headache(784.0)     SIM (obstructive sleep apnea) 2023       Family History   Problem Relation Age of Onset    Cancer Maternal Grandmother         ovarian    Cancer Mother        Social History     Socioeconomic History    Marital status: Single     Spouse name: Not on file    Number of children: Not on file    Years of education: Not on file    Highest education level: Not on file   Occupational History    Not on file   Tobacco Use    Smoking status: Never    Smokeless tobacco: Never   Vaping Use    Vaping status: Never Used   Substance and Sexual Activity    Alcohol use: No    Drug use: No    Sexual activity: Yes     Partners: Female   Other Topics Concern    Not on file   Social History Narrative    Not on file     Social Drivers of Health     Financial Resource Strain: Low Risk  (2024)    Overall Financial Resource Strain (CARDIA)     Difficulty of Paying Living Expenses: Not hard at all   Food Insecurity: No Food Insecurity (2025)    Hunger Vital Sign     Worried

## 2025-07-25 DIAGNOSIS — F43.23 ADJUSTMENT DISORDER WITH MIXED ANXIETY AND DEPRESSED MOOD: ICD-10-CM

## 2025-07-25 RX ORDER — BUSPIRONE HYDROCHLORIDE 15 MG/1
15 TABLET ORAL 2 TIMES DAILY
Qty: 180 TABLET | Refills: 3 | Status: SHIPPED | OUTPATIENT
Start: 2025-07-25

## 2025-08-13 ENCOUNTER — OFFICE VISIT (OUTPATIENT)
Dept: FAMILY MEDICINE CLINIC | Age: 40
End: 2025-08-13
Payer: COMMERCIAL

## 2025-08-13 VITALS
SYSTOLIC BLOOD PRESSURE: 118 MMHG | HEIGHT: 71 IN | HEART RATE: 82 BPM | OXYGEN SATURATION: 96 % | DIASTOLIC BLOOD PRESSURE: 80 MMHG | BODY MASS INDEX: 32.76 KG/M2 | WEIGHT: 234 LBS

## 2025-08-13 DIAGNOSIS — G44.52 NEW DAILY PERSISTENT HEADACHE: ICD-10-CM

## 2025-08-13 DIAGNOSIS — R06.81 WITNESSED EPISODE OF APNEA: Primary | ICD-10-CM

## 2025-08-13 DIAGNOSIS — F43.23 ADJUSTMENT DISORDER WITH MIXED ANXIETY AND DEPRESSED MOOD: ICD-10-CM

## 2025-08-13 DIAGNOSIS — G47.9 SLEEP DISTURBANCE: ICD-10-CM

## 2025-08-13 DIAGNOSIS — G47.33 OSA (OBSTRUCTIVE SLEEP APNEA): ICD-10-CM

## 2025-08-13 PROCEDURE — G8417 CALC BMI ABV UP PARAM F/U: HCPCS | Performed by: FAMILY MEDICINE

## 2025-08-13 PROCEDURE — G8427 DOCREV CUR MEDS BY ELIG CLIN: HCPCS | Performed by: FAMILY MEDICINE

## 2025-08-13 PROCEDURE — 99213 OFFICE O/P EST LOW 20 MIN: CPT | Performed by: FAMILY MEDICINE

## 2025-08-13 PROCEDURE — 1036F TOBACCO NON-USER: CPT | Performed by: FAMILY MEDICINE

## 2025-08-13 ASSESSMENT — ENCOUNTER SYMPTOMS: SHORTNESS OF BREATH: 0

## 2025-08-28 DIAGNOSIS — F43.23 ADJUSTMENT DISORDER WITH MIXED ANXIETY AND DEPRESSED MOOD: ICD-10-CM

## 2025-08-28 RX ORDER — DESVENLAFAXINE 50 MG/1
50 TABLET, FILM COATED, EXTENDED RELEASE ORAL DAILY
Qty: 90 TABLET | Refills: 3 | Status: SHIPPED | OUTPATIENT
Start: 2025-08-28